# Patient Record
Sex: MALE | Race: WHITE | Employment: OTHER | URBAN - METROPOLITAN AREA
[De-identification: names, ages, dates, MRNs, and addresses within clinical notes are randomized per-mention and may not be internally consistent; named-entity substitution may affect disease eponyms.]

---

## 2017-01-01 ENCOUNTER — TELEPHONE (OUTPATIENT)
Dept: FAMILY MEDICINE CLINIC | Facility: CLINIC | Age: 82
End: 2017-01-01

## 2017-01-01 ENCOUNTER — HOSPITAL ENCOUNTER (OUTPATIENT)
Dept: CV DIAGNOSTICS | Facility: HOSPITAL | Age: 82
Discharge: HOME | End: 2017-01-01
Attending: INTERNAL MEDICINE
Payer: MEDICARE

## 2017-01-01 ENCOUNTER — MED REC SCAN ONLY (OUTPATIENT)
Dept: FAMILY MEDICINE CLINIC | Facility: CLINIC | Age: 82
End: 2017-01-01

## 2017-01-01 DIAGNOSIS — R00.2 AWARENESS OF HEART BEAT: ICD-10-CM

## 2017-01-01 DIAGNOSIS — R00.1: ICD-10-CM

## 2017-01-01 DIAGNOSIS — R53.83 OTHER FATIGUE: Primary | ICD-10-CM

## 2017-01-01 DIAGNOSIS — E55.9 VITAMIN D DEFICIENCY: ICD-10-CM

## 2017-01-01 PROCEDURE — 93225 XTRNL ECG REC<48 HRS REC: CPT | Performed by: INTERNAL MEDICINE

## 2017-01-01 PROCEDURE — 93227 XTRNL ECG REC<48 HR R&I: CPT | Performed by: INTERNAL MEDICINE

## 2017-01-01 PROCEDURE — 93226 XTRNL ECG REC<48 HR SCAN A/R: CPT | Performed by: INTERNAL MEDICINE

## 2017-01-01 RX ORDER — PANTOPRAZOLE SODIUM 40 MG/1
TABLET, DELAYED RELEASE ORAL
Qty: 90 TABLET | Refills: 3 | Status: SHIPPED | OUTPATIENT
Start: 2017-01-01 | End: 2018-01-01

## 2017-01-01 RX ORDER — LANCETS
EACH MISCELLANEOUS
Qty: 100 EACH | Refills: 3 | Status: SHIPPED | OUTPATIENT
Start: 2017-01-01 | End: 2018-01-01

## 2017-01-01 RX ORDER — SIMVASTATIN 20 MG
TABLET ORAL
Qty: 90 TABLET | Refills: 1 | Status: SHIPPED | OUTPATIENT
Start: 2017-01-01 | End: 2018-01-01

## 2017-01-01 RX ORDER — ESCITALOPRAM OXALATE 10 MG/1
TABLET ORAL
Qty: 90 TABLET | Refills: 3 | Status: SHIPPED | OUTPATIENT
Start: 2017-01-01 | End: 2018-01-01

## 2017-01-01 RX ORDER — CARVEDILOL 3.12 MG/1
3.12 TABLET ORAL 2 TIMES DAILY WITH MEALS
Qty: 60 TABLET | Refills: 3 | Status: SHIPPED | OUTPATIENT
Start: 2017-01-01 | End: 2017-01-01

## 2017-01-01 RX ORDER — CARVEDILOL 3.12 MG/1
TABLET ORAL
Qty: 180 TABLET | Refills: 2 | Status: SHIPPED | OUTPATIENT
Start: 2017-01-01 | End: 2018-01-01

## 2017-01-01 RX ORDER — ALBUTEROL SULFATE 90 UG/1
AEROSOL, METERED RESPIRATORY (INHALATION)
Qty: 25.5 G | Refills: 3 | Status: SHIPPED | OUTPATIENT
Start: 2017-01-01

## 2017-01-01 RX ORDER — SIMVASTATIN 20 MG
TABLET ORAL
Qty: 90 TABLET | Refills: 1 | Status: SHIPPED | OUTPATIENT
Start: 2017-01-01 | End: 2017-01-01

## 2017-01-02 ENCOUNTER — TELEPHONE (OUTPATIENT)
Dept: FAMILY MEDICINE CLINIC | Facility: CLINIC | Age: 82
End: 2017-01-02

## 2017-01-02 ENCOUNTER — OFFICE VISIT (OUTPATIENT)
Dept: FAMILY MEDICINE CLINIC | Facility: CLINIC | Age: 82
End: 2017-01-02

## 2017-01-02 VITALS
WEIGHT: 200 LBS | DIASTOLIC BLOOD PRESSURE: 68 MMHG | RESPIRATION RATE: 22 BRPM | TEMPERATURE: 99 F | SYSTOLIC BLOOD PRESSURE: 112 MMHG | HEART RATE: 78 BPM | BODY MASS INDEX: 26 KG/M2

## 2017-01-02 DIAGNOSIS — J18.9 CAP (COMMUNITY ACQUIRED PNEUMONIA): Primary | ICD-10-CM

## 2017-01-02 PROCEDURE — 99214 OFFICE O/P EST MOD 30 MIN: CPT | Performed by: FAMILY MEDICINE

## 2017-01-02 RX ORDER — IPRATROPIUM BROMIDE AND ALBUTEROL SULFATE 2.5; .5 MG/3ML; MG/3ML
3 SOLUTION RESPIRATORY (INHALATION) EVERY 6 HOURS PRN
Qty: 360 ML | Refills: 3 | Status: SHIPPED | OUTPATIENT
Start: 2017-01-02

## 2017-01-02 RX ORDER — IPRATROPIUM BROMIDE AND ALBUTEROL SULFATE 2.5; .5 MG/3ML; MG/3ML
3 SOLUTION RESPIRATORY (INHALATION) EVERY 6 HOURS PRN
COMMUNITY
End: 2017-01-02

## 2017-01-02 NOTE — PATIENT INSTRUCTIONS
Exercising with Chronic Lung Disease: Keep Moving! Think about what you can do to make exercise a way of life. How can you work it into your daily routine? How can you make it more enjoyable? The suggestions below may lead to some ideas of your own. · Exhale as you slowly press the weights upward. Reach as high as you comfortably can. Avoid locking your elbows. · Inhale while you slowly lower the weights to your shoulders. Repeat as directed.   Special instructions: ___________________________________ 1. Relax your neck and shoulder muscles. Inhale slowly through your nose for at least 2 counts. 2. Pucker your lips as if to blow out a candle. Exhale slowly and gently through your pursed lips for at least twice as long as you inhaled.    © 6829-2079 The Dyspnea Scale adapted by permission from [facility] [facility], 2004, Guidelines for Cardiac Rehabilitation and Secondary Prevention Programs, 4th ed.  [facility], [place]: [employer]), 81.        Checking your h

## 2017-01-02 NOTE — PROGRESS NOTES
Patient presents with:  Hospital F/U: [facility] admitted on 12/20/16 UTI and pneumonia      HPI:   Rosie Lundberg is a 80year old male who presents for pneumonia f/u from the hospital.  Cough is improving, now mild,intermittent, pt still on antibioti SOLOSTAR) 300 UNIT/ML Subcutaneous Solution Pen-injector Inject 2 Units into the skin every morning. Disp: 2 pen Rfl: 0   docusate sodium 100 MG Oral Cap Take 100 mg by mouth 2 (two) times daily.  Disp: 60 capsule Rfl: 0   B Complex-C (SUPER B COMPLEX OR) T Comment Procedure: ESOPHAGOGASTRODUODENOSCOPY (EGD);   Surgeon: Je Pena MD;  Location: [facility] ENDOSCOPY    LAPAROSCOPIC CHOLECYSTECTOMY N/A 9/28/2016    Comment Procedure: LAPAROSCOPIC CHOLECYSTECTOMY;  Surgeon: Madison Gonzalez MD;  Location: [facility] ipratropium-albuterol (DUONEB) 0.5-2.5 (3) MG/3ML Inhalation Solution; Take 3 mL by nebulization every 6 (six) hours as needed. Follow up with Dr. Silverio Robert. The patient indicates understanding of these issues and agrees to the plan.   The patient is ask

## 2017-01-04 ENCOUNTER — TELEPHONE (OUTPATIENT)
Dept: FAMILY MEDICINE CLINIC | Facility: CLINIC | Age: 82
End: 2017-01-04

## 2017-02-02 ENCOUNTER — OFFICE VISIT (OUTPATIENT)
Dept: FAMILY MEDICINE CLINIC | Facility: CLINIC | Age: 82
End: 2017-02-02

## 2017-02-02 VITALS
OXYGEN SATURATION: 91 % | WEIGHT: 200 LBS | RESPIRATION RATE: 20 BRPM | SYSTOLIC BLOOD PRESSURE: 118 MMHG | BODY MASS INDEX: 26 KG/M2 | DIASTOLIC BLOOD PRESSURE: 72 MMHG | HEART RATE: 76 BPM

## 2017-02-02 DIAGNOSIS — Z00.00 MEDICARE ANNUAL WELLNESS VISIT, SUBSEQUENT: Primary | ICD-10-CM

## 2017-02-02 DIAGNOSIS — E78.00 PURE HYPERCHOLESTEROLEMIA: ICD-10-CM

## 2017-02-02 DIAGNOSIS — E11.9 TYPE 2 DIABETES MELLITUS WITHOUT COMPLICATION, WITH LONG-TERM CURRENT USE OF INSULIN (HCC): ICD-10-CM

## 2017-02-02 DIAGNOSIS — Z91.81 AT RISK FOR FALLING: ICD-10-CM

## 2017-02-02 DIAGNOSIS — K21.9 GASTROESOPHAGEAL REFLUX DISEASE WITHOUT ESOPHAGITIS: ICD-10-CM

## 2017-02-02 DIAGNOSIS — Z99.81 OXYGEN DEPENDENT: ICD-10-CM

## 2017-02-02 DIAGNOSIS — I10 BENIGN HYPERTENSION: ICD-10-CM

## 2017-02-02 DIAGNOSIS — J41.1 MUCOPURULENT CHRONIC BRONCHITIS (HCC): ICD-10-CM

## 2017-02-02 DIAGNOSIS — Z79.4 TYPE 2 DIABETES MELLITUS WITHOUT COMPLICATION, WITH LONG-TERM CURRENT USE OF INSULIN (HCC): ICD-10-CM

## 2017-02-02 DIAGNOSIS — Z23 NEED FOR PNEUMOCOCCAL VACCINATION: ICD-10-CM

## 2017-02-02 PROCEDURE — 90732 PPSV23 VACC 2 YRS+ SUBQ/IM: CPT | Performed by: FAMILY MEDICINE

## 2017-02-02 PROCEDURE — G0009 ADMIN PNEUMOCOCCAL VACCINE: HCPCS | Performed by: FAMILY MEDICINE

## 2017-02-02 PROCEDURE — G0439 PPPS, SUBSEQ VISIT: HCPCS | Performed by: FAMILY MEDICINE

## 2017-02-02 RX ORDER — SIMVASTATIN 20 MG
20 TABLET ORAL NIGHTLY
Qty: 30 TABLET | Refills: 6 | Status: SHIPPED | OUTPATIENT
Start: 2017-02-02

## 2017-02-02 NOTE — PROGRESS NOTES
Ranjit Sanchez is a 80year old male who presents for a Medicare wellness complete physical exam.     HPI:         Needs refills of medications for Need for pneumococcal vaccination  (primary encounter diagnosis)    Wt Readings from Last 3 Encounters:  02/0 Rfl: 3   ipratropium-albuterol (DUONEB) 0.5-2.5 (3) MG/3ML Inhalation Solution Take 3 mL by nebulization every 6 (six) hours as needed. Disp: 360 mL Rfl: 3   furosemide (LASIX) 20 MG Oral Tab 1/2 to 1 tablet daily prn.  Disp: 90 tablet Rfl: 3   PROAIR HFA 1 each Rfl: 0      HISTORICAL INFORMATION   Past Medical History   Diagnosis Date   • Type II or unspecified type diabetes mellitus without mention of complication, not stated as uncontrolled    • Unspecified essential hypertension    • Other and unspecified : Y. Children: Y.    Exercise: minimal.  Diet: watches sugar closely and watches calories closely     REVIEW OF SYSTEMS:   GENERAL: feels well otherwise  SKIN: denies any unusual skin lesions  EYES: denies blurred vision or double vision  HEENT: am[age] year old male who presents for a Medicare wellness complete physical exam.   Pt' s weight is Body mass index is 25.67 kg/(m^2). , patient given information on exercise and healthy diet.       The patient indicates understanding of these issues and agrees to Vaccine, No Preserv, 3YR +                          10/12/2015      Pneumococcal (Prevnar 13)                          10/12/2013      Pneumovax 23 (Lot Mgr)                          02/02/2017

## 2017-02-02 NOTE — PATIENT INSTRUCTIONS
Exercising with Chronic Lung Disease: Increasing Strength  Building muscle strength will allow you to do certain activities with less effort. Some of these exercises involve using hand weights.  If you don’t have weights at home, you can use water bottles 4. Inhale while you lower your leg. Repeat as directed. Then switch legs. Leg raise   Note: If you’re not comfortable lying on the floor, this exercise can also be done sitting in a chair.   Special instructions: ________________________________________

## 2017-02-10 ENCOUNTER — MED REC SCAN ONLY (OUTPATIENT)
Dept: FAMILY MEDICINE CLINIC | Facility: CLINIC | Age: 82
End: 2017-02-10

## 2017-02-27 ENCOUNTER — TELEPHONE (OUTPATIENT)
Dept: FAMILY MEDICINE CLINIC | Facility: CLINIC | Age: 82
End: 2017-02-27

## 2017-02-27 NOTE — TELEPHONE ENCOUNTER
Fax received from [facility] regarding DM supplies. Medicare form needs to be signed and faxed back. Form provided to Dr. Aditi Kasper to complete and fax.

## 2017-03-01 ENCOUNTER — TELEPHONE (OUTPATIENT)
Dept: FAMILY MEDICINE CLINIC | Facility: CLINIC | Age: 82
End: 2017-03-01

## 2017-03-01 NOTE — TELEPHONE ENCOUNTER
DM supply form completed again and faxed back along with last DM f/up on 10/12/16, med list, and last A1c.

## 2017-03-20 RX ORDER — CARVEDILOL 3.12 MG/1
3.12 TABLET ORAL 2 TIMES DAILY WITH MEALS
Qty: 60 TABLET | Refills: 3 | Status: SHIPPED | OUTPATIENT
Start: 2017-03-20 | End: 2017-01-01

## 2017-03-21 ENCOUNTER — MED REC SCAN ONLY (OUTPATIENT)
Dept: FAMILY MEDICINE CLINIC | Facility: CLINIC | Age: 82
End: 2017-03-21

## 2017-03-28 ENCOUNTER — APPOINTMENT (OUTPATIENT)
Dept: GENERAL RADIOLOGY | Facility: HOSPITAL | Age: 82
DRG: 177 | End: 2017-03-28
Attending: EMERGENCY MEDICINE
Payer: MEDICARE

## 2017-03-28 ENCOUNTER — APPOINTMENT (OUTPATIENT)
Dept: CT IMAGING | Facility: HOSPITAL | Age: 82
DRG: 177 | End: 2017-03-28
Attending: STUDENT IN AN ORGANIZED HEALTH CARE EDUCATION/TRAINING PROGRAM
Payer: MEDICARE

## 2017-03-28 ENCOUNTER — HOSPITAL ENCOUNTER (INPATIENT)
Facility: HOSPITAL | Age: 82
LOS: 6 days | Discharge: HOME HEALTH | DRG: 177 | End: 2017-04-03
Attending: EMERGENCY MEDICINE | Admitting: STUDENT IN AN ORGANIZED HEALTH CARE EDUCATION/TRAINING PROGRAM
Payer: MEDICARE

## 2017-03-28 DIAGNOSIS — I10 ESSENTIAL HYPERTENSION: ICD-10-CM

## 2017-03-28 DIAGNOSIS — E11.9 TYPE 2 DIABETES MELLITUS WITHOUT COMPLICATION, WITHOUT LONG-TERM CURRENT USE OF INSULIN (HCC): ICD-10-CM

## 2017-03-28 DIAGNOSIS — J84.10 PULMONARY FIBROSIS (HCC): ICD-10-CM

## 2017-03-28 DIAGNOSIS — J40 BRONCHITIS: ICD-10-CM

## 2017-03-28 DIAGNOSIS — R06.00 DYSPNEA, UNSPECIFIED TYPE: Primary | ICD-10-CM

## 2017-03-28 PROCEDURE — 71250 CT THORAX DX C-: CPT

## 2017-03-28 PROCEDURE — 99222 1ST HOSP IP/OBS MODERATE 55: CPT | Performed by: STUDENT IN AN ORGANIZED HEALTH CARE EDUCATION/TRAINING PROGRAM

## 2017-03-28 PROCEDURE — 71010 XR CHEST AP PORTABLE  (CPT=71010): CPT

## 2017-03-28 RX ORDER — HEPARIN SODIUM 5000 [USP'U]/ML
5000 INJECTION, SOLUTION INTRAVENOUS; SUBCUTANEOUS EVERY 8 HOURS
Status: DISCONTINUED | OUTPATIENT
Start: 2017-03-28 | End: 2017-04-03

## 2017-03-28 RX ORDER — IPRATROPIUM BROMIDE AND ALBUTEROL SULFATE 2.5; .5 MG/3ML; MG/3ML
3 SOLUTION RESPIRATORY (INHALATION)
Status: DISCONTINUED | OUTPATIENT
Start: 2017-03-28 | End: 2017-04-03

## 2017-03-28 RX ORDER — METHYLPREDNISOLONE SODIUM SUCCINATE 125 MG/2ML
60 INJECTION, POWDER, LYOPHILIZED, FOR SOLUTION INTRAMUSCULAR; INTRAVENOUS EVERY 8 HOURS
Status: DISCONTINUED | OUTPATIENT
Start: 2017-03-28 | End: 2017-03-28

## 2017-03-28 RX ORDER — ACETAMINOPHEN 325 MG/1
650 TABLET ORAL EVERY 6 HOURS PRN
Status: DISCONTINUED | OUTPATIENT
Start: 2017-03-28 | End: 2017-04-03

## 2017-03-28 RX ORDER — PREDNISONE 20 MG/1
40 TABLET ORAL
Status: DISCONTINUED | OUTPATIENT
Start: 2017-03-29 | End: 2017-03-31

## 2017-03-28 RX ORDER — METHYLPREDNISOLONE SODIUM SUCCINATE 125 MG/2ML
80 INJECTION, POWDER, LYOPHILIZED, FOR SOLUTION INTRAMUSCULAR; INTRAVENOUS EVERY 6 HOURS
Status: DISCONTINUED | OUTPATIENT
Start: 2017-03-29 | End: 2017-03-28

## 2017-03-28 RX ORDER — CLINDAMYCIN PHOSPHATE 600 MG/50ML
600 INJECTION INTRAVENOUS EVERY 8 HOURS
Status: DISCONTINUED | OUTPATIENT
Start: 2017-03-28 | End: 2017-03-31

## 2017-03-28 RX ORDER — ATORVASTATIN CALCIUM 10 MG/1
10 TABLET, FILM COATED ORAL NIGHTLY
Status: DISCONTINUED | OUTPATIENT
Start: 2017-03-28 | End: 2017-04-03

## 2017-03-28 RX ORDER — ONDANSETRON 2 MG/ML
4 INJECTION INTRAMUSCULAR; INTRAVENOUS EVERY 4 HOURS PRN
Status: DISCONTINUED | OUTPATIENT
Start: 2017-03-28 | End: 2017-03-28

## 2017-03-28 RX ORDER — ESCITALOPRAM OXALATE 10 MG/1
10 TABLET ORAL DAILY
Status: DISCONTINUED | OUTPATIENT
Start: 2017-03-28 | End: 2017-04-03

## 2017-03-28 RX ORDER — ONDANSETRON 2 MG/ML
4 INJECTION INTRAMUSCULAR; INTRAVENOUS EVERY 6 HOURS PRN
Status: DISCONTINUED | OUTPATIENT
Start: 2017-03-28 | End: 2017-04-03

## 2017-03-28 RX ORDER — METHYLPREDNISOLONE SODIUM SUCCINATE 125 MG/2ML
125 INJECTION, POWDER, LYOPHILIZED, FOR SOLUTION INTRAMUSCULAR; INTRAVENOUS ONCE
Status: COMPLETED | OUTPATIENT
Start: 2017-03-28 | End: 2017-03-28

## 2017-03-28 RX ORDER — ALBUTEROL SULFATE 2.5 MG/3ML
2.5 SOLUTION RESPIRATORY (INHALATION) EVERY 2 HOUR PRN
Status: DISCONTINUED | OUTPATIENT
Start: 2017-03-28 | End: 2017-04-03

## 2017-03-28 RX ORDER — PREDNISONE 20 MG/1
20 TABLET ORAL ONCE
Status: COMPLETED | OUTPATIENT
Start: 2017-03-28 | End: 2017-03-28

## 2017-03-28 RX ORDER — IPRATROPIUM BROMIDE AND ALBUTEROL SULFATE 2.5; .5 MG/3ML; MG/3ML
3 SOLUTION RESPIRATORY (INHALATION) ONCE
Status: COMPLETED | OUTPATIENT
Start: 2017-03-28 | End: 2017-03-28

## 2017-03-28 RX ORDER — CARVEDILOL 3.12 MG/1
3.12 TABLET ORAL 2 TIMES DAILY WITH MEALS
Status: DISCONTINUED | OUTPATIENT
Start: 2017-03-28 | End: 2017-04-03

## 2017-03-28 RX ORDER — DEXTROSE MONOHYDRATE 25 G/50ML
50 INJECTION, SOLUTION INTRAVENOUS
Status: DISCONTINUED | OUTPATIENT
Start: 2017-03-28 | End: 2017-04-03

## 2017-03-28 RX ORDER — PANTOPRAZOLE SODIUM 40 MG/1
40 TABLET, DELAYED RELEASE ORAL DAILY
Status: DISCONTINUED | OUTPATIENT
Start: 2017-03-28 | End: 2017-04-03

## 2017-03-28 RX ORDER — SODIUM CHLORIDE 9 MG/ML
INJECTION, SOLUTION INTRAVENOUS CONTINUOUS
Status: DISCONTINUED | OUTPATIENT
Start: 2017-03-28 | End: 2017-03-28

## 2017-03-28 RX ORDER — ASPIRIN 81 MG/1
81 TABLET, CHEWABLE ORAL DAILY
Status: DISCONTINUED | OUTPATIENT
Start: 2017-03-28 | End: 2017-04-03

## 2017-03-28 RX ORDER — CEFDINIR 300 MG/1
300 CAPSULE ORAL 2 TIMES DAILY
Status: ON HOLD | COMMUNITY
End: 2017-04-03

## 2017-03-28 NOTE — PLAN OF CARE
RESPIRATORY - ADULT    • Achieves optimal ventilation and oxygenation Progressing          SAFETY ADULT - FALL    • Free from fall injury Progressing          PROBLEM: SOB, + metapneumovirus, possible PNA    ASSESSMENT: Pt is A&O x4, hard of hearing.  VSS,

## 2017-03-28 NOTE — H&P
[facility]ITAL[facility]  History and Physical     Hector [place] Patient Status:  Emergency    10/6/1925 MRN [id number]   Location [address] [facility] Attending Andre Martell MD   Hosp Day # 0 PCP Jennifer Garcia MD     Chief Complaint: Cough ENDOSCOPIC ULTRASOUND EXAM  9/16    Comment pancreas cysts; cystic duct stones    EGD N/A 9/20/2016    Comment Procedure: ESOPHAGOGASTRODUODENOSCOPY (EGD);   Surgeon: Sandy Min MD;  Location: [address] [place] [address]    LAPAROSCOPIC CHOLECYSTECTOMY N/A 9/28/2 not apply Misc Test blood glucose 4 times daily Disp: 400 each Rfl: 4   Insulin Pen Needle (BD PEN NEEDLE MINI U/F) 31G X 5 MM Does not apply Misc USE 4 TIMES DAILY Disp: 150 each Rfl: 4   furosemide (LASIX) 20 MG Oral Tab 1/2 to 1 tablet daily prn.  Disp: tenderness or deformity. Abdomen: Soft, nontender, nondistended. Positive bowel sounds. No rebound, guarding or organomegaly. Neurologic: No focal neurological deficits. CNII-XII grossly intact. Musculoskeletal: Moves all extremities.   Extremities: No

## 2017-03-28 NOTE — CM/SW NOTE
03/28/17 1500   CM/SW Referral Data   Referral Source Physician;Social Work (self-referral)   Reason for Referral Discharge planning   Informant Patient; Children   Pertinent Medical Hx   Primary Care Physician Name Arianna Osborne 103   Patient Info   Patient's M

## 2017-03-28 NOTE — ED PROVIDER NOTES
Patient Seen in: [facility] Emergency Department    History   Patient presents with:  Fever Sepsis (infectious)    Stated Complaint: fever cough     HPI    69-year-old male with history of type 2 diabetes, hypertension, hyperlipidemia, stroke, anxie Comment cardiac    BACK SURGERY      HIP REPLACEMENT SURGERY      Comment lt hip    HERNIA SURGERY      PROSTATE LASER ENUCLEATION      OTHER SURGICAL HISTORY  04/25/2016    Comment cystoscopy- dr. Pennington   9/16    Comment panc Tab,  Take 1 tablet (10 mg total) by mouth daily. Pantoprazole Sodium 40 MG Oral Tab EC,  Take 1 tablet (40 mg total) by mouth daily.    Insulin Glargine (TOUJEO SOLOSTAR) 300 UNIT/ML Subcutaneous Solution Pen-injector,  Inject 2 Units into the skin every is no lymphadenopathy or carotid bruit. Cardiovascular exam: Regular rate and rhythm. There are no murmurs, rubs, gallops. Lungs: Coarse breath sounds bilaterally. There is no audible wheezes, Rales, rhonchi. Abdomen: Soft, nontender, nondistended.   Pily Nadir coarse breath sounds on examination. He will be admitted for observation continued nebulizer treatments per  Chest x-ray shows increase in interstitial markings which appear to be consistent with pulmonary fibrosis.   Patient empirically treated with Kari Montano

## 2017-03-28 NOTE — ED INITIAL ASSESSMENT (HPI)
Pt with c/o fever, cough and weakness for past few days. Seen yesterday at another hosp. Negative Chest xray.

## 2017-03-28 NOTE — CONSULTS
[facility] Pulmonary, Critical Care and Sleep    Gracia Cordero Patient Status:  Inpatient    10/6/1925 MRN [id number]   Location 504/504-A PCP Essence Rasheed MD     Date of Admission: 3/28/2017  History of Present Illness: Pt is a 80year old male consulted f Comment pancreas cysts; cystic duct stones    EGD N/A 9/20/2016    Comment Procedure: ESOPHAGOGASTRODUODENOSCOPY (EGD);   Surgeon: Jamal Hooker MD;  Location: [address] [place] [address] ENDOSCOPY    LAPAROSCOPIC CHOLECYSTECTOMY N/A 9/28/2016    Comment Procedure: John Godoy Pantoprazole Sodium 40 MG Oral Tab EC Take 1 tablet (40 mg total) by mouth daily. Disp: 90 tablet Rfl: 3   Insulin Glargine (TOUJEO SOLOSTAR) 300 UNIT/ML Subcutaneous Solution Pen-injector Inject 2 Units into the skin every morning.  Disp: 2 pen Rfl: 0   B Work:Retired  for [employer]. Former  [employer], [address], [employer]. Was on front lines and in the field. TB: no known   Asbestos: no known.      Family History   Problem Relation Age of Onset   • Cancer Father MCHC 32.7 31.0-37.0 g/dL   RDW 13.2 11.5-16.0 %   RDW-SD 45.5 35.1-46.3 fL   Neutrophil Absolute Prelim 5.04 1.30-6.70 x10 (3) uL   Neutrophil Absolute 5.04 1.30-6.70 x10(3) uL   Lymphocyte Absolute 1.46 0.90-4.00 x10(3) uL   Monocyte Absolute 0.60 0.10-0. Total Protein 7.5 6.1-8.3 g/dL   Albumin 3.0 (L) 3.5-4.8 g/dL   Sodium 140 136-144 mmol/L   Potassium 4.2 3.6-5.1 mmol/L   Chloride 106 101-111 mmol/L   CO2 27.0 22.0-32.0 mmol/L   -HEMOGLOBIN A1C   Collection Time: 03/28/17  5:27 AM   Result Value Ref Ran 1. Dyspnea and hypoxia secondary to likely viral pneumonitis with underlying pulmonary fibrosis. ? also aspiration. Could all be explained with metapneumovirus infection. O2 protocol. 2. ID: Pneumonitis. Likely viral.   PCT was negative.    Ok to h

## 2017-03-28 NOTE — PHYSICAL THERAPY NOTE
PHYSICAL THERAPY EVALUATION - INPATIENT     Room Number: 143/133-H  Evaluation Date: 3/28/2017  Type of Evaluation: Initial  Physician Order: PT Eval and Treat    Presenting Problem:  (Dyspnea, cough,fever, Pulmonary fibrosis)  Reason for Therapy: Franklyn flight   • Esophageal reflux    • Anesthesia complication      slow to awaken   • Arthritis        Past Surgical History      Past Surgical History    APPENDECTOMY      SURGICAL STENT      Comment cardiac    BACK SURGERY      HIP REPLACEMENT SURGERY      C within functional limits     Lower extremity strength is within functional limits     BALANCE  Static Sitting: Good  Dynamic Sitting: Good  Static Standing: Fair -  Dynamic Standing: Poor +    ADDITIONAL TESTS  Additional Tests: Elderly Mobility Scale home. Pt prefers higher surfaces for sit<>stand. Trained pt for transfers with RW/CGAx1. Pt needed lots of VC for proper hand placement, safety and to avoid premature sitting. Pt with decrease safety awareness.  Trained pt for amb to 60ft with RW/Min to CG mobility and needs assist for mobility and amb. These impairments manifest themselves as functional limitations in mobility, transfers, gait endurance and distance. Pt has chair lift at home so don't need to negotiate stairs.   The patient is below his

## 2017-03-28 NOTE — SLP NOTE
ADULT SWALLOWING EVALUATION    ASSESSMENT & PLAN   ASSESSMENT  Pt seen at bedside this AM for bedside swallow evaluation. Speech consulted due to pt presenting with aspiration and MD concerned for aspiration given pt's dysphagia history.  Pt cooperative, pl History   Diagnosis Date   • Type II or unspecified type diabetes mellitus without mention of complication, not stated as uncontrolled    • Unspecified essential hypertension    • Other and unspecified hyperlipidemia    • Stroke [facility])    • Kidney stone recommended.     Coronary artery calcification.        CXR 3/28/17:   Impression      CONCLUSION:  Extensive interstitial opacities may relate to chronic interstitial lung disease with superimposed infectious process not excluded.  Small right pleural effus

## 2017-03-28 NOTE — OCCUPATIONAL THERAPY NOTE
OCCUPATIONAL THERAPY EVALUATION - INPATIENT     Room Number: 062/367-U  Evaluation Date: 3/28/2017  Type of Evaluation: Initial  Presenting Problem: fever, cough, pulmonary fibrosis, dyspnea and chills     Physician Order: IP Consult to Occupational Thersideangelo Metz cardiac    BACK SURGERY      HIP REPLACEMENT SURGERY      Comment lt hip    HERNIA SURGERY      PROSTATE LASER ENUCLEATION      OTHER SURGICAL HISTORY  04/25/2016    Comment cystoscopy- dr. Gill Alfaro  9/16    Comment pancreas cysts; COORDINATION  Gross Motor    WFL    Fine Motor    WFL      ADDITIONAL TESTS                       Other Test(s): [place] IADL scale: 2/8              IADL  Pt scored a 2/8 on the [place] Instrumental Activities of Daily Living Scale indicating a signific standing>sit in his chair with min assist and min cues for RW use and hand placement. Pt was educated on safety precautions. Pt was left in his chair with questions answered, needs met and call light within reach.  Pt's RN/PCT was made aware of pt's present transfer to toilet:  with modified independent    UE Exercise Program Goal  Patient will be independent with bilateral AROM HEP (home exercise program). Additional Goals:  Pt will demo good safety awareness with ADL and functional mobility.

## 2017-03-28 NOTE — PLAN OF CARE
METABOLIC/FLUID AND ELECTROLYTES - ADULT    • Glucose maintained within prescribed range Progressing    • Electrolytes maintained within normal limits Progressing        Patient/Family Goals    • Patient/Family Long Term Goal Progressing    • Patient/Famil

## 2017-03-28 NOTE — PROGRESS NOTES
Pt feels ok though still w/ dyspnea. He endorses a history of ? Aspiration in the past.  RVP +ve for metapneumovirus. Cont. Supportive treatment. Start ABX for presumptive PNA on top of viral bronchitis.   Not completely clear given his NL PCT though fev

## 2017-03-29 ENCOUNTER — APPOINTMENT (OUTPATIENT)
Dept: GENERAL RADIOLOGY | Facility: HOSPITAL | Age: 82
DRG: 177 | End: 2017-03-29
Attending: HOSPITALIST
Payer: MEDICARE

## 2017-03-29 ENCOUNTER — APPOINTMENT (OUTPATIENT)
Dept: GENERAL RADIOLOGY | Facility: HOSPITAL | Age: 82
DRG: 177 | End: 2017-03-29
Attending: INTERNAL MEDICINE
Payer: MEDICARE

## 2017-03-29 PROBLEM — J20.9 BRONCHOSPASM WITH BRONCHITIS, ACUTE: Status: ACTIVE | Noted: 2017-03-29

## 2017-03-29 PROBLEM — J12.3 HUMAN METAPNEUMOVIRUS (HMPV) PNEUMONIA: Status: ACTIVE | Noted: 2017-03-29

## 2017-03-29 PROCEDURE — 71035 XR CHEST DECUBITUS (CPT=71035): CPT

## 2017-03-29 PROCEDURE — 74230 X-RAY XM SWLNG FUNCJ C+: CPT

## 2017-03-29 PROCEDURE — 99233 SBSQ HOSP IP/OBS HIGH 50: CPT | Performed by: INTERNAL MEDICINE

## 2017-03-29 PROCEDURE — 71020 XR CHEST PA + LAT CHEST (CPT=71020): CPT

## 2017-03-29 RX ORDER — HYDRALAZINE HYDROCHLORIDE 20 MG/ML
5 INJECTION INTRAMUSCULAR; INTRAVENOUS EVERY 6 HOURS PRN
Status: DISCONTINUED | OUTPATIENT
Start: 2017-03-29 | End: 2017-04-03

## 2017-03-29 NOTE — PROGRESS NOTES
[facility]  Progress Note    Malin Bank Patient Status:  Inpatient    10/6/1925 MRN [id number]   [facility] 5NW-A Attending Jose A Yoon MD   Hosp Day # 1 PCP Jill Dickson MD     CC: Cough, fever, shortness of breath    SUBJEC lateral chest radiographs were obtained.      PATIENT STATED HISTORY: (As transcribed by Technologist)  Patient adds no additional history.             FINDINGS:     LUNGS:  Stable. Interstitial opacities diffusely throughout both lungs.  The opacity at the 2-10 Units Subcutaneous TID CC and HS   ipratropium-albuterol (DUONEB) nebulizer solution 3 mL 3 mL Nebulization 4 times per day   albuterol sulfate (VENTOLIN) (2.5 MG/3ML) 0.083% nebulizer solution 2.5 mg 2.5 mg Nebulization Q2H PRN   CefTRIAXone Sodium ( were discussed with patient       Alejandrina Orozco MD  3/29/2017  4:09 PM

## 2017-03-29 NOTE — PROGRESS NOTES
SPO2% ON ROOM AIR AT REST: N/A ( did not test pt is on home o2)     SPO2% ON O2 AT REST: 93% ON 3 LITERS PER MINUTE     SPO2% AMBULATION ON ROOM AIR: N/A ( did not test pt is on home o2)    SPO2% AMBULATION ON O2: 90% ON: 3  LITERS PER MINUTE

## 2017-03-29 NOTE — PROGRESS NOTES
Multidisciplinary Discharge Rounds held 3/29/2017. Treatment team members present today include , , Charge Nurse,  Nurse, RT, PT and Pharmacy caring for [place].      Other care providers present:    Patient Active Problem

## 2017-03-29 NOTE — PROGRESS NOTES
pulm / cc    Pt was off floor when attempted to see, will see tomorrow; of note decub film with only small L effusion, would continue to hold on thoracentesis.

## 2017-03-29 NOTE — VIDEO SWALLOW STUDY NOTE
ADULT VIDEOFLUOROSCOPIC SWALLOWING STUDY    Admission Date: 3/28/2017  Evaluation Date: 03/29/2017  Radiologist: Evita Fulton    Dear Dr. Chon East,  This letter is to inform you of [facility] Videofluoroscopic Swallowing Study results and/or plan of treatme Current Diet Consistency: NPO  Prior Level of Function: Assistance/Support for ADL's  Prior Living Situation: Home with support  History of Recent: Pneumonia; Difficulty breathing  Precautions: None  Imaging results: CXR 3/29/17:      Impression      rate;Small bites and sips;Effortful swallow  Effectiveness: Yes         HARD SOLID  Triggered at: Valleculae  Premature Spillage to: Valleculae  Cleared/Reduced with: Secondary swallow  Strategy(ies) Implemented (Hard Solid):  Slow rate;Small bites and sips elevation by completing Mendelsohn exercise 10x with max verbal and visual cues to increase laryngeal movement and opening of CP to assure safe/efficiency swallow.     Goal #6 Pt will increase opening of upper esophgeal sphincter to improve anterior larynge

## 2017-03-30 PROCEDURE — 99232 SBSQ HOSP IP/OBS MODERATE 35: CPT | Performed by: INTERNAL MEDICINE

## 2017-03-30 RX ORDER — DOCUSATE SODIUM 100 MG/1
100 CAPSULE, LIQUID FILLED ORAL 2 TIMES DAILY
Status: DISCONTINUED | OUTPATIENT
Start: 2017-03-30 | End: 2017-04-03

## 2017-03-30 RX ORDER — POLYETHYLENE GLYCOL 3350 17 G/17G
17 POWDER, FOR SOLUTION ORAL DAILY
Status: DISCONTINUED | OUTPATIENT
Start: 2017-03-30 | End: 2017-04-03

## 2017-03-30 RX ORDER — BISACODYL 10 MG
10 SUPPOSITORY, RECTAL RECTAL
Status: DISCONTINUED | OUTPATIENT
Start: 2017-03-30 | End: 2017-04-03

## 2017-03-30 NOTE — PROGRESS NOTES
[facility]  Progress Note    Ritu Guzman Patient Status:  Inpatient    10/6/1925 MRN [id number]   [facility] 5NW-A Attending Saige Otero MD   [address] Day # 2 PCP Clementine Monterroso MD     CC: Cough, fever, shortness of breath    SUBJEC 3/28/2017, 0:49.  EDJOSTIN , CT CHEST (CGS=35649), 3/28/2017, 3:04.      TECHNIQUE:  PA and lateral chest radiographs were obtained.      PATIENT STATED HISTORY: (As transcribed by Technologist)  Patient adds no additional history.             FINDINGS:    dextrose injection 50 mL 50 mL Intravenous Q15 Min PRN   Or      glucose (DEX4) oral liquid 30 g 30 g Oral Q15 Min PRN   Or      Glucose-Vitamin C (DEX-4) 4-0.006 g chewable tab 8 tablet 8 tablet Oral Q15 Min PRN   insulin aspart (NOVOLOG) 100 UNIT/ML fl clinda.   8. Constipation- colace, miralax; dulcolaax suppository prn; improved          Quality:  · DVT Prophylaxis: SCDs, subcutaneous heparin  · CODE status: Full  · Dennis: None    Estimated date of discharge: saturday  Discharge is dependent on: [facility]

## 2017-03-30 NOTE — SLP NOTE
SPEECH DAILY NOTE - INPATIENT    Evaluation Date: 03/30/2017    ASSESSMENT & PLAN   ASSESSMENT  Pt seen at bedside this PM for speech therapy session. Pt cooperative, pleasant, and alert. Pt's son present at bedside.  Pt self reporting tolerance of nectar t 1-2 session(s). IN PROGRESS   Goal #2  The patient/family/caregiver will demonstrate understanding and implementation of aspiration precautions and swallow strategies independently over 1-2 session(s). IN PROGRESS   Goal #3  Pt will increase posterior tong

## 2017-03-30 NOTE — PLAN OF CARE
Impaired Activities of Daily Living    • Achieve highest/safest level of independence in self care Progressing        Impaired Swallowing    • Minimize aspiration risk Progressing        Patient/Family Goals    • Patient/Family Short Term Goal Progressing date: TBD    Current discharge plan: Home with family and [facility]

## 2017-03-30 NOTE — PROGRESS NOTES
[facility] Pulmonary, Critical Care and Sleep    Melissapoppy Calhounmaida Patient Status:  Inpatient    10/6/1925 MRN [id number]   [facility] 5NW-A Attending Rayna Mariee MD   [facility] Day # 2 PCP Ruperto Sethi MD       Date of Admission: 3/28/2017 12:06 AM General: NAD. Neuro: Alert, no focal deficits. HEENT: PERRL  Neck : No LAD  CV: RRR, nl S1, S2, no S4, S3 or murmur. Lungs: Coarse at bases. Abd: Nontender, non distended. Ext: No edema. Skin: No rashes.         Recent Results (from the past 24

## 2017-03-31 PROCEDURE — 99232 SBSQ HOSP IP/OBS MODERATE 35: CPT | Performed by: INTERNAL MEDICINE

## 2017-03-31 RX ORDER — PREDNISONE 20 MG/1
20 TABLET ORAL
Status: COMPLETED | OUTPATIENT
Start: 2017-04-01 | End: 2017-04-03

## 2017-03-31 NOTE — OCCUPATIONAL THERAPY NOTE
OCCUPATIONAL THERAPY TREATMENT NOTE - INPATIENT     Room Number: 983/005-K  Session: 1   Number of Visits to Meet Established Goals: 5    Presenting Problem: fever, cough, pulmonary fibrosis, dyspnea and chills     History related to current admission: Pt HIP REPLACEMENT SURGERY      Comment lt hip    HERNIA SURGERY      PROSTATE LASER ENUCLEATION      OTHER SURGICAL HISTORY  04/25/2016    Comment cystoscopy- dr. Kyree Salas  9/16    Comment pancreas cysts; cystic duct stones    EGD stand>sit. Pt was educated on and performed AROM exercises x 5 reps each for B UE's in all major planes with min cues for proper technique and form. Written HEP issued. Pt was left with call light, telephone and personal items within reach.  All questions transfer from sit to stand:  with modified independent PROGRESSING  Patient will transfer to toilet:  with modified independent PROGRESSING    UE Exercise Program Goal  Patient will be independent with bilateral AROM HEP (home exercise program).  ISSUED 3/3

## 2017-03-31 NOTE — CONSULTS
INFECTIOUS DISEASE CONSULT NOTE    Rosie Lundberg Patient Status:  Inpatient    10/6/1925 MRN [id number]   [facility] 5NW-A Attending Haydee Colin MD   [facility] Day # 3 PCP Lucero Soni • Anesthesia complication      slow to awaken   • Arthritis          Past Surgical History    APPENDECTOMY      SURGICAL STENT      Comment cardiac    BACK SURGERY      HIP REPLACEMENT SURGERY      Comment lt hip    HERNIA SURGERY      PROSTATE LASER ENUCL •  Umeclidinium Bromide (INCRUSE ELLIPTA) 62.5 MCG/INH inhaler 1 puff, 1 puff, Inhalation, Daily  •  aspirin chewable tab 81 mg, 81 mg, Oral, Daily  •  carvedilol (COREG) tab 3.125 mg, 3.125 mg, Oral, BID with meals  •  escitalopram (LEXAPRO) tablet 10 mg, Cardiovascular: distant. Seems to have regular rate and rhythm. Abdomen: Soft, nontender, nondistended. Positive bowel sounds. Musculoskeletal: Full range of motion of all extremities. No swelling noted.   Integument: No rash    Laboratory Data:    Rece   Specimen Information: Blood from Blood,peripheral       Blood Culture Result No Growth 3 Days      Respiratory Panel FLU expanded Once [[id number]] (Abnormal) Collected: 03/28/17 0042     Order Status: Completed Lab Status: Final result Updated: 03/28/17 3/29/2017  CONCLUSION:  1. Left effusion is small, no evidence of loculation.     Dictated by: Jos Light MD on 3/29/2017 at 10:05     Approved by: Jos Light MD            Xr Chest Pa + Lat Chest (jpr=[id number])    3/29/2017  PROCEDURE:  XR CHEST P 3/28/2017  PROCEDURE:  CT OF THE CHEST WITHOUT CONTRAST  COMPARISON:  [facility] , XR CHEST AP PORTABLE  (CPT=71010), 3/28/2017, 0:49.   INDICATIONS:  Fever, cough  TECHNIQUE:  Unenhanced multislice CT scanning is performed through the chest.  Dose reduction te 3/29/2017  PROCEDURE:  SWALLOWING STUDY  TECHNIQUE:  Video fluoroscopic swallowing study was performed in cooperation with the speech pathologist.  Barium of varying consistencies was administered orally with patient in lateral projection.   COMPARISON:  ED 3/28/2017  CONCLUSION:  Extensive interstitial opacities may relate to chronic interstitial lung disease with superimposed infectious process not excluded. Small right pleural effusion.   A preliminary report was provided by the [facility] teleradiology service

## 2017-03-31 NOTE — PROGRESS NOTES
Pt is a 81 y/o male admitted with acute bronchitis with bronchospasm and viral pneumonitis. alert oriented. denies SOB,fever,or N/V.pt is on iv clindamycin and ceftriaxone. on contact and droplet for Gilman pneumo viral infection. up in the chair. pt is on 3 L 02

## 2017-03-31 NOTE — PLAN OF CARE
Problem: Patient/Family Goals  Goal: Patient/Family Short Term Goal  Patient’s Short Term Goal:   3/28PM: to get some sleep  3/29 AM- walk in hallway   3/29PM: have a BM  3/30 AM - \"to stay awake\"  3/30 noc: to keep getting better   3/31-reslove pna    I

## 2017-03-31 NOTE — PROGRESS NOTES
[address] Patient Status:  Inpatient    10/6/1925 MRN [id number]   [facility] 5NW-A Attending Kirsten Reaves MD   Hosp Day # 3 PCP Bradley Sheets MD     Pulm / Critical Care Progress Note     S: Feels better      Sc murmur. Abdomen: soft, non-tender, non-distended, positive BS. Extremity: no edema         Recent Labs   Lab  03/31/17   0520   PLT  160.0     No results for input(s): INR in the last 72 hours.       No results for input(s): NA, K, CL, CO2, BUN, GLUCOSE

## 2017-03-31 NOTE — PHYSICAL THERAPY NOTE
PHYSICAL THERAPY TREATMENT NOTE - INPATIENT    Room Number: 290/605-M     Session: 1   Number of Visits to Meet Established Goals: 5    Presenting Problem:  (Dyspnea, cough,fever, Pulmonary fibrosis)    Problem List  Principal Problem:    Human metapneumo Procedure: ESOPHAGOGASTRODUODENOSCOPY (EGD);   Surgeon: Gretchen Sherman MD;  Location: [facility] ENDOSCOPY    LAPAROSCOPIC CHOLECYSTECTOMY N/A 9/28/2016    Comment Procedure: LAPAROSCOPIC CHOLECYSTECTOMY;  Surgeon: Kacy Gallo MD;  Location: [facility] MAIN OR tested  Comment :  (Above stated is FIM score)    Skilled Therapy Provided: Pt received seated in chair, agreeable to PT. Pt demos sit to/from stand with supervision. Ambulation x75 with CGA. Noted increased kyphotic posture with distance and fatigue.

## 2017-03-31 NOTE — PROGRESS NOTES
[facility]  Progress Note    Jose Silva Patient Status:  Inpatient    10/6/1925 MRN [id number]   [place] 5NW-A Attending Shannan Chavez MD   Hosp Day # 3 PCP Salome Caldera MD     CC: Cough, fever, shortness of breath    SUBJEC 3/28/2017, 3:04.      TECHNIQUE:  PA and lateral chest radiographs were obtained.      PATIENT STATED HISTORY: (As transcribed by Technologist)  Patient adds no additional history.             FINDINGS:     LUNGS:  Stable.  Interstitial opacities diffusely PRN   Or      glucose (DEX4) oral liquid 30 g 30 g Oral Q15 Min PRN   Or      Glucose-Vitamin C (DEX-4) 4-0.006 g chewable tab 8 tablet 8 tablet Oral Q15 Min PRN   insulin aspart (NOVOLOG) 100 UNIT/ML flexpen 2-10 Units 2-10 Units Subcutaneous TID CC and H miralax; dulcolaax suppository prn; improved          Quality:  · DVT Prophylaxis: SCDs, subcutaneous heparin  · CODE status: Full  · Dennis: None    Estimated date of discharge: saturday  Discharge is dependent on: Clinical progress  At this point Mr. Meg Young

## 2017-04-01 PROCEDURE — 99232 SBSQ HOSP IP/OBS MODERATE 35: CPT | Performed by: INTERNAL MEDICINE

## 2017-04-01 NOTE — PROGRESS NOTES
INFECTIOUS DISEASE PROGRESS NOTE    Brian Duncan Patient Status:  Inpatient    10/6/1925 MRN [id number]   [facility] 5NW-A Attendi %   03/31/17 2052 154/88 mmHg - - 75 - 98 %   03/31/17 1942 - - - - - 92 %   03/31/17 1929 (!) 164/82 mmHg 98.7 °F (37.1 °C) Oral 74 20 100 %         General: No acute distress. Alert and oriented x 3. HEENT: no scleral icterus or conjunctival injection. RLL infiltrate although difficult to read in the setting of extensive pulm fibrosis.                 -Presentation seems c/w bacterial pna (cough with purulent sputum and high fevers) in a pt with a recent viral URI, would treat as MRSA pna.    4. pulm fibr

## 2017-04-01 NOTE — PLAN OF CARE
Impaired Functional Mobility    • Achieve highest/safest level of mobility/gait Progressing        Impaired Swallowing    • Minimize aspiration risk Progressing        METABOLIC/FLUID AND ELECTROLYTES - ADULT    • Glucose maintained within prescribed range

## 2017-04-01 NOTE — PROGRESS NOTES
[address] Patient Status:  Inpatient    10/6/1925 MRN [id number]   [facility] 5NW-A Attending Dina Welch MD   Hosp Day # 4 PCP Ki Stauffer MD     Pulm / Critical Care Progress Note     S: Pt feeling a bit bet rhythm, normal S1S2, no murmur. Abdomen: soft, non-tender, non-distended, positive BS. Extremity: no edema       Recent Labs   Lab  03/31/17   0520   PLT  160.0     No results for input(s): INR in the last 72 hours.       Recent Labs   Lab  04/01/17   0

## 2017-04-01 NOTE — PROGRESS NOTES
[facility]  Progress Note    Jose Brian Patient Status:  Inpatient    10/6/1925 MRN [id number]   [facility] 5NW-A Attending Kirsten Reaves MD   [address] Day # 4 PCP Bradley Sheets MD     CC: Cough, fever, shortness of breath    SUBJEC MARY , CT CHEST (IGK=[id number]), 3/28/2017, 3:04.      TECHNIQUE:  PA and lateral chest radiographs were obtained.      PATIENT STATED HISTORY: (As transcribed by Technologist)  Patient adds no additional history.             FINDINGS:     LUNGS:  Stable.  In (DEX4) oral liquid 15 g 15 g Oral Q15 Min PRN   Or      Glucose-Vitamin C (DEX-4) 4-0.006 g chewable tab 4 tablet 4 tablet Oral Q15 Min PRN   Or      dextrose injection 50 mL 50 mL Intravenous Q15 Min PRN   Or      glucose (DEX4) oral liquid 30 g 30 g Oral Sam Sterling  is expected to be discharge to: based on PT OT-24 hour care/supervision;Home with home health PT      Questions/concerns and Plan of care were discussed with patient and son at bedside      Sonali Shelby MD  4/1/2017

## 2017-04-01 NOTE — PROGRESS NOTES
[facility] dosing service    Initial Pharmacokinetic Consult for Vancomycin Dosing     Ricardo Singh is a 80year old male who is being treated for MRSA pneumonia.   Pharmacy has been asked to dose Vancomycin by Dr. Nella Mitchell    He is allergic to levaquin; appreciate the opportunity to assist in his care.     Tesha Cline, PharmD  3/31/2017  7:08 PM  [address] [facility]: [phone]

## 2017-04-02 PROCEDURE — 99232 SBSQ HOSP IP/OBS MODERATE 35: CPT | Performed by: INTERNAL MEDICINE

## 2017-04-02 RX ORDER — AMLODIPINE BESYLATE 2.5 MG/1
2.5 TABLET ORAL DAILY
Status: DISCONTINUED | OUTPATIENT
Start: 2017-04-02 | End: 2017-04-03

## 2017-04-02 NOTE — PROGRESS NOTES
[facility]  Progress Note    Shreya Serrano Patient Status:  Inpatient    10/6/1925 MRN [id number]   [facility] 5NW-A Attending Yee Jones MD   [address] Day # 5 PCP Fabrice Castro MD     CC: Cough, fever, shortness of breath    SUBJEC CHEST AP PORTABLE  (CPT=71010), 3/28/2017, 0:49.  EDWARD , CT CHEST (KIW=[id number]), 3/28/2017, 3:04.      TECHNIQUE:  PA and lateral chest radiographs were obtained.      PATIENT STATED HISTORY: (As transcribed by Technologist)  Patient adds no additional his injection 4 mg 4 mg Intravenous Q6H PRN   glucose (DEX4) oral liquid 15 g 15 g Oral Q15 Min PRN   Or      Glucose-Vitamin C (DEX-4) 4-0.006 g chewable tab 4 tablet 4 tablet Oral Q15 Min PRN   Or      dextrose injection 50 mL 50 mL Intravenous Q15 Min PRN None    Estimated date of discharge:tomorrow  Discharge is dependent on: Clinical progress, ID and pulm clearance; BP controlled  At this point Mr. Nai Diallo  is expected to be discharge to: based on PT OT-24 hour care/supervision;Home with home health PT

## 2017-04-03 ENCOUNTER — TELEPHONE (OUTPATIENT)
Dept: FAMILY MEDICINE CLINIC | Facility: CLINIC | Age: 82
End: 2017-04-03

## 2017-04-03 VITALS
OXYGEN SATURATION: 98 % | SYSTOLIC BLOOD PRESSURE: 138 MMHG | DIASTOLIC BLOOD PRESSURE: 68 MMHG | TEMPERATURE: 97 F | BODY MASS INDEX: 26.12 KG/M2 | HEIGHT: 74 IN | WEIGHT: 203.5 LBS | HEART RATE: 86 BPM | RESPIRATION RATE: 18 BRPM

## 2017-04-03 PROCEDURE — 99239 HOSP IP/OBS DSCHRG MGMT >30: CPT | Performed by: INTERNAL MEDICINE

## 2017-04-03 RX ORDER — AMLODIPINE BESYLATE 2.5 MG/1
2.5 TABLET ORAL EVERY EVENING
Qty: 30 TABLET | Refills: 0 | Status: ON HOLD | OUTPATIENT
Start: 2017-04-03 | End: 2018-01-01

## 2017-04-03 RX ORDER — DOXYCYCLINE HYCLATE 100 MG
100 TABLET ORAL 2 TIMES DAILY
Qty: 10 TABLET | Refills: 0 | Status: SHIPPED | OUTPATIENT
Start: 2017-04-03 | End: 2017-04-08

## 2017-04-03 RX ORDER — FUROSEMIDE 20 MG/1
TABLET ORAL
Status: ON HOLD | COMMUNITY
End: 2018-01-01

## 2017-04-03 NOTE — PHYSICAL THERAPY NOTE
PHYSICAL THERAPY TREATMENT NOTE - INPATIENT    Room Number: 259/108-K     Session: 2   Number of Visits to Meet Established Goals: 5    Presenting Problem:  (Dyspnea, cough,fever, Pulmonary fibrosis)    Problem List  Principal Problem:    Human metapneumo Procedure: ESOPHAGOGASTRODUODENOSCOPY (EGD);   Surgeon: Rey Park MD;  Location: [address] [place] [address]    LAPAROSCOPIC CHOLECYSTECTOMY N/A 9/28/2016    Comment Procedure: LAPAROSCOPIC CHOLECYSTECTOMY;  Surgeon: Juni Johnson MD;  Location: [address] [place] [address] Not tested  Comment :  (Above stated is FIM score)    Skilled Therapy Provided: RN approved session. Pt received seated EOB c PCT. Pt t/f bed >chair c RW and assist from PCT. Pt performed seated therex for BLE, cues for technique.  Pt on 3L NC, pt able to

## 2017-04-03 NOTE — DISCHARGE SUMMARY
[facility] HOSPITALIST  DISCHARGE SUMMARY     Tory Flores Patient Status:  Inpatient    10/6/1925 MRN [id number]   [facility] 5NW-A Attending Jannie Lr MD   [facility] Day # 6 PCP Nicolette Carpio MD     Date of Admission: 3/28/2017  Date of D effusion, prominent mediastinal lymph nodes. He was admitted for suspected pneumonia and pulmonology was consulted. Respiratory viral panel returned positive for metapneumovirus. He continued on Rocephin/clindamycin IV for suspected pneumonia.   Sputum c Instructions Prescription details    furosemide 20 MG Tabs   Commonly known as:  LASIX   What changed:  Another medication with the same name was removed. Continue taking this medication, and follow the directions you see here.         Take 10 mg -20 mg (1/ USE 4 TIMES DAILY    Quantity:  150 each   Refills:  4       ipratropium-albuterol 0.5-2.5 (3) MG/3ML Soln   Last time this was given:  3 mL on 4/3/2017  2:05 PM   Commonly known as:  DUONEB        Take 3 mL by nebulization every 6 (six) hours as neede [facility]    Future Appointments  Date Time Provider Morena Browni   4/6/2017 5:00 PM Nikia Pierce MD EMG [address] EMG [facility]   4/10/2017 10:30 AM ANTHONY Mckeon [address]UL[address]       ---------------------------------------------- IV vancomycin per ID.   regular solids with nectar thick liquids aspiration precautions with upright position, small bites and sips, no stool, slow rate, medications 1 pill at the time and present with thickened fluid, dysphagia therapy.    · Constipation-

## 2017-04-03 NOTE — PROGRESS NOTES
Pulmonary Progress Note      NAME: Hector Soler - ROOM: 9/9- - MRN: [id number] - Age: 80year old - : 10/6/1925    Assessment/Plan:  1. Dyspnea and hypoxia secondary to likely viral pneumonitis with underlying pulmonary fibrosis.  MRSA pneumonia as kg)  BMI 26.12 kg/m2  SpO2 98%  Physical Exam:   General: alert, cooperative, no respiratory distress. HEENT: Normocephalic atraumatic. Lips, mucosa, and tongue normal.  No thrush noted.    Lungs: bibasilar crackles   Chest wall: No tenderness or deformity

## 2017-04-03 NOTE — SLP NOTE
SPEECH DAILY NOTE - INPATIENT    Evaluation Date: 04/03/2017    ASSESSMENT & PLAN   ASSESSMENT  Pt seen sitting up in chair this AM for speech therapy services. Pt cooperative, pleasant, and alert. No family or caregivers present at bedside.  Pt denies comp during all PO intake of solids and liquids with mod verbal and visual cues to clear residue in the valleculae and/or pharynx and to increase base of tongue strength.  IN PROGRESS      Goal #4   Pt will increase tongue base retraction and strengthen pharynge

## 2017-04-03 NOTE — PROGRESS NOTES
[facility] HOSPITAL[facility]  Progress Note     Rosa Manuel Patient Status:  Inpatient    10/6/1925 MRN [id number]   [facility] 5NW-A Attending Belinda De Guzman MD   Hosp Day # 6 PCP Serena Way MD     Chief Complaint: deconditioning    S: Patie Oral Daily   • Atorvastatin Calcium  10 mg Oral Nightly   • Heparin Sodium (Porcine)  5,000 Units Subcutaneous Q8H   • insulin aspart  2-10 Units Subcutaneous TID CC and HS   • ipratropium-albuterol  3 mL Nebulization 4 times per day   • insulin aspart  1- normal  Extremities: extremities normal,no cyanosis or edema  Pulses: 2+ and symmetric  Skin: Skin color, texture, turgor normal. No rashes or lesions  Neurologic: Awake,alert,nonfocal  Psych: Mood and affect appears normal    A/P as above.    1. Cough feve

## 2017-04-03 NOTE — PROGRESS NOTES
[facility]  TEL: [phone]  FAX: [phone]    Jose Silva Patient Status:  Inpatient    10/6/1925 MRN [id number]   [facility] 5NW-A Attending Shannan Chavez MD   [facility] Day # 6 PCP KENNY Canseco seems c/w bacterial pna (cough with purulent sputum and high fevers) in a pt with a recent viral URI, would treat as MRSA pna.    4. pulm fibrosis    PLAN:              -cont IV vanco while here but OK for d/c from my standpoint once ok with other MDs.  Wou

## 2017-04-03 NOTE — TELEPHONE ENCOUNTER
Called pt, spoke to Jalen per Gerard. She stated the pt is being discharged soon and that he is doing much better. She stated his breathing has improved and that the pulmonologist is happy with his progress. Original HFU was on 4.6. 16 however Jalen changed

## 2017-04-04 ENCOUNTER — PATIENT OUTREACH (OUTPATIENT)
Dept: CASE MANAGEMENT | Age: 82
End: 2017-04-04

## 2017-04-04 DIAGNOSIS — J12.3 HUMAN METAPNEUMOVIRUS (HMPV) PNEUMONIA: Primary | ICD-10-CM

## 2017-04-04 DIAGNOSIS — J84.10 PULMONARY FIBROSIS (HCC): ICD-10-CM

## 2017-04-04 NOTE — PROGRESS NOTES
NURSING DISCHARGE NOTE    Discharged Home via Wheelchair. Accompanied by Family member and Support staff  Belongings Taken by patient/family. VSS. Iv discontinued. Discharge instructions given to patient and his daughter.  They verbalized Florencia

## 2017-04-04 NOTE — PROGRESS NOTES
Initial Post Discharge Follow Up   Discharge Date: 4/3/17  Contact Date: 4/4/2017    Consent Verification:  Assessment Completed With: Caregiver: Ankush mackch received per patient?  written  HIPAA Verified? Yes    1.  Tell me why you were in the h Rfl: 4   ipratropium-albuterol (DUONEB) 0.5-2.5 (3) MG/3ML Inhalation Solution Take 3 mL by nebulization every 6 (six) hours as needed.  Disp: 360 mL Rfl: 3   ONETOUCH ULTRASOFT LANCETS Does not apply Misc Test blood glucose 4 times daily Disp: 400 each Rfl care services / DME ordered upon discharge? Home PT, Home RN and Oxygen- HH RN to call for appt today per dtr. Dtr states pt has resumed his normal home O2 of 3L via nasal cannula.       11. Are you able to do normal activities as you did prior to your hos chills/shaking, chest pain, productive cough, difficulty breathing, etc.  She states  has also taught her these sx to monitor for.      Are you experiencing a change in symptoms (shortness of breath, ALMARAZ, wheezing) since your discharge from the hospital?

## 2017-04-04 NOTE — CM/SW NOTE
Patient discharged on 04/03/2017 as previously planned.          04/04/17 0800   Discharge disposition   Discharged to: Home-Health   Name of Facillity/Home Care/Hospice ([facility])   Home services after discharge Skilled home care   Discharge transporta

## 2017-04-05 ENCOUNTER — OFFICE VISIT (OUTPATIENT)
Dept: FAMILY MEDICINE CLINIC | Facility: CLINIC | Age: 82
End: 2017-04-05

## 2017-04-05 VITALS
TEMPERATURE: 98 F | HEART RATE: 82 BPM | RESPIRATION RATE: 16 BRPM | OXYGEN SATURATION: 97 % | DIASTOLIC BLOOD PRESSURE: 68 MMHG | SYSTOLIC BLOOD PRESSURE: 118 MMHG

## 2017-04-05 DIAGNOSIS — J98.01 BRONCHOSPASM: ICD-10-CM

## 2017-04-05 DIAGNOSIS — J84.10 PULMONARY FIBROSIS (HCC): ICD-10-CM

## 2017-04-05 DIAGNOSIS — Z99.81 OXYGEN DEPENDENT: ICD-10-CM

## 2017-04-05 DIAGNOSIS — J18.9 CAP (COMMUNITY ACQUIRED PNEUMONIA): Primary | ICD-10-CM

## 2017-04-05 PROCEDURE — 99495 TRANSJ CARE MGMT MOD F2F 14D: CPT | Performed by: FAMILY MEDICINE

## 2017-04-05 RX ORDER — METHYLPREDNISOLONE 4 MG/1
TABLET ORAL
Qty: 1 KIT | Refills: 0 | Status: SHIPPED | OUTPATIENT
Start: 2017-04-05 | End: 2017-05-11 | Stop reason: ALTCHOICE

## 2017-04-05 NOTE — PROGRESS NOTES
CC: Hospital F/u. HPI:    Brian Duncan is a 80year old male here today for hospital follow up.    He was discharged from Inpatient hospital, [facility] to Home   Admission Date: 3/28/17   Discharge Date: 4/3/17  Hospital Discharge Diagnosis:Pneumo mouth every evening. furosemide 20 MG Oral Tab Take 10 mg -20 mg (1/2 to 1 tablet) daily as needed   CARVEDILOL 3.125 MG Oral Tab Take 1 tablet (3.125 mg total) by mouth 2 (two) times daily with meals.    simvastatin 20 MG Oral Tab Take 1 tablet (20 mg to Coronary atherosclerosis; High cholesterol; Cancer ([address] Cataract; Osteoarthritis; Muscle weakness; BPH (benign prostatic hyperplasia); Deep vein thrombosis ([address] Esophageal reflux; Anesthesia complication; and Arthritis.     He  has past surgical history chest tenderness  LUNGS: clear to auscultation  CARDIO: RRR without murmur  GI: good BS's, no masses, HSM or tenderness  MUSCULOSKELETAL: back is not tender, FROM of the extremities  EXTREMITIES: no cyanosis, clubbing or edema  NEURO: Oriented times three,

## 2017-04-10 ENCOUNTER — TELEPHONE (OUTPATIENT)
Dept: FAMILY MEDICINE CLINIC | Facility: CLINIC | Age: 82
End: 2017-04-10

## 2017-04-10 NOTE — TELEPHONE ENCOUNTER
Christiano Brumfield from [facility] called to inform Dr. Nicole Rockwell that the pt fell off his bed on Saturday night. He has a bruise on his LT ear but everything else was OK. Pt has an appt tomorrow 4/11.  Call back number in case it's needded for Christiano Brumfield i

## 2017-04-11 ENCOUNTER — OFFICE VISIT (OUTPATIENT)
Dept: FAMILY MEDICINE CLINIC | Facility: CLINIC | Age: 82
End: 2017-04-11

## 2017-04-11 ENCOUNTER — HOSPITAL ENCOUNTER (OUTPATIENT)
Dept: GENERAL RADIOLOGY | Age: 82
Discharge: HOME | End: 2017-04-11
Attending: FAMILY MEDICINE
Payer: MEDICARE

## 2017-04-11 ENCOUNTER — TELEPHONE (OUTPATIENT)
Dept: FAMILY MEDICINE CLINIC | Facility: CLINIC | Age: 82
End: 2017-04-11

## 2017-04-11 VITALS
SYSTOLIC BLOOD PRESSURE: 108 MMHG | HEART RATE: 82 BPM | WEIGHT: 203 LBS | BODY MASS INDEX: 26 KG/M2 | TEMPERATURE: 98 F | DIASTOLIC BLOOD PRESSURE: 74 MMHG | RESPIRATION RATE: 16 BRPM

## 2017-04-11 DIAGNOSIS — J98.11 ATELECTASIS: Primary | ICD-10-CM

## 2017-04-11 DIAGNOSIS — J18.9 CAP (COMMUNITY ACQUIRED PNEUMONIA): ICD-10-CM

## 2017-04-11 DIAGNOSIS — J98.01 BRONCHOSPASM: ICD-10-CM

## 2017-04-11 DIAGNOSIS — J84.10 PULMONARY FIBROSIS (HCC): ICD-10-CM

## 2017-04-11 DIAGNOSIS — Z99.81 OXYGEN DEPENDENT: ICD-10-CM

## 2017-04-11 DIAGNOSIS — J18.9 CAP (COMMUNITY ACQUIRED PNEUMONIA): Primary | ICD-10-CM

## 2017-04-11 PROCEDURE — 71020 XR CHEST PA + LAT CHEST (CPT=71020): CPT

## 2017-04-11 PROCEDURE — 99214 OFFICE O/P EST MOD 30 MIN: CPT | Performed by: FAMILY MEDICINE

## 2017-04-11 NOTE — PATIENT INSTRUCTIONS
Pneumonia (Adult)  Pneumonia is an infection deep within the lungs. It is in the small air sacs (alveoli). Pneumonia may be caused by a virus or bacteria. Pneumonia caused by bacteria is usually treated with an antibiotic.  Severe cases may need to be shravan If you are 72 or older, you should get a pneumococcal vaccine and a yearly flu (influenza) shot. You should also get these vaccines if you have chronic lung disease like asthma, emphysema, or COPD. Ask your provider about this.   When to seek medical advice

## 2017-04-11 NOTE — PROGRESS NOTES
Patient presents with:   Follow - Up  Fall: Frankey Adams out of bed saturday evening hit head shoulder brusied stomach       HPI:   Max Alejo is a 80year old male who presents for pneumonia f/u from the hospital.  Cough is improving, now mild,intermittent, pt WHEEZING Disp: 25.5 g Rfl: 3   Glucose Blood (ONETOUCH ULTRA BLUE) In Vitro Strip Test blood glucose 4 times daily Disp: 400 each Rfl: 1   escitalopram 10 MG Oral Tab Take 1 tablet (10 mg total) by mouth daily.  Disp: 90 tablet Rfl: 3   Pantoprazole Sodium 04/25/2016    Comment cystoscopy- dr. Fong Rued  9/16    Comment pancreas cysts; cystic duct stones    EGD N/A 9/20/2016    Comment Procedure: ESOPHAGOGASTRODUODENOSCOPY (EGD);   Surgeon: Ar Ford MD;  Location: [facility] ENDOSCO sx's persist or worsen.

## 2017-04-11 NOTE — TELEPHONE ENCOUNTER
----- Message from Mirna Kelly MD sent at 4/11/2017 12:47 PM CDT -----  Looks more like atelectasies not pneumonia. OK to recheck CXR in 2 weeks. No new meds, thank you.

## 2017-04-12 NOTE — TELEPHONE ENCOUNTER
Called patient's daughter and spoke with her per HIPAA. Advised her of results and POC below. She states understanding. Future xray order placed in Epic.

## 2017-04-28 ENCOUNTER — TELEPHONE (OUTPATIENT)
Dept: FAMILY MEDICINE CLINIC | Facility: CLINIC | Age: 82
End: 2017-04-28

## 2017-04-28 NOTE — TELEPHONE ENCOUNTER
Received [facility] orders for home health and request for face to face. Forms completed and given to [facility] to fax.

## 2017-05-02 ENCOUNTER — MED REC SCAN ONLY (OUTPATIENT)
Dept: FAMILY MEDICINE CLINIC | Facility: CLINIC | Age: 82
End: 2017-05-02

## 2017-05-11 ENCOUNTER — LAB ENCOUNTER (OUTPATIENT)
Dept: LAB | Age: 82
End: 2017-05-11
Attending: FAMILY MEDICINE
Payer: MEDICARE

## 2017-05-11 ENCOUNTER — TELEPHONE (OUTPATIENT)
Dept: FAMILY MEDICINE CLINIC | Facility: CLINIC | Age: 82
End: 2017-05-11

## 2017-05-11 ENCOUNTER — HOSPITAL ENCOUNTER (OUTPATIENT)
Dept: GENERAL RADIOLOGY | Age: 82
Discharge: HOME | End: 2017-05-11
Attending: FAMILY MEDICINE
Payer: MEDICARE

## 2017-05-11 ENCOUNTER — OFFICE VISIT (OUTPATIENT)
Dept: FAMILY MEDICINE CLINIC | Facility: CLINIC | Age: 82
End: 2017-05-11

## 2017-05-11 VITALS
SYSTOLIC BLOOD PRESSURE: 110 MMHG | OXYGEN SATURATION: 98 % | HEART RATE: 72 BPM | TEMPERATURE: 99 F | WEIGHT: 205 LBS | RESPIRATION RATE: 22 BRPM | BODY MASS INDEX: 26 KG/M2 | DIASTOLIC BLOOD PRESSURE: 70 MMHG

## 2017-05-11 DIAGNOSIS — R35.0 FREQUENCY OF URINATION: Primary | ICD-10-CM

## 2017-05-11 DIAGNOSIS — R06.02 SOB (SHORTNESS OF BREATH): ICD-10-CM

## 2017-05-11 PROCEDURE — 99214 OFFICE O/P EST MOD 30 MIN: CPT | Performed by: FAMILY MEDICINE

## 2017-05-11 PROCEDURE — 71020 XR CHEST PA + LAT CHEST (CPT=71020): CPT | Performed by: FAMILY MEDICINE

## 2017-05-11 PROCEDURE — 85025 COMPLETE CBC W/AUTO DIFF WBC: CPT

## 2017-05-11 PROCEDURE — 87077 CULTURE AEROBIC IDENTIFY: CPT | Performed by: FAMILY MEDICINE

## 2017-05-11 PROCEDURE — 87186 SC STD MICRODIL/AGAR DIL: CPT | Performed by: FAMILY MEDICINE

## 2017-05-11 PROCEDURE — 87086 URINE CULTURE/COLONY COUNT: CPT | Performed by: FAMILY MEDICINE

## 2017-05-11 PROCEDURE — 80053 COMPREHEN METABOLIC PANEL: CPT

## 2017-05-11 PROCEDURE — 81003 URINALYSIS AUTO W/O SCOPE: CPT | Performed by: FAMILY MEDICINE

## 2017-05-11 PROCEDURE — 36415 COLL VENOUS BLD VENIPUNCTURE: CPT

## 2017-05-11 RX ORDER — DOXYCYCLINE HYCLATE 100 MG/1
CAPSULE ORAL
Qty: 8 CAPSULE | Refills: 0 | Status: SHIPPED | OUTPATIENT
Start: 2017-05-11 | End: 2018-01-01

## 2017-05-11 NOTE — PROGRESS NOTES
Dustin Martinez is a 80year old male presents with SOB. HPI:  The patient complaints of SOB, moderate, started: few days ago, worse with physical activities and better with rest.  Not new symptoms for the pt but worse. No cold, no cough, no wheezing. blood glucose 4 times daily Disp: 400 each Rfl: 1   escitalopram 10 MG Oral Tab Take 1 tablet (10 mg total) by mouth daily. Disp: 90 tablet Rfl: 3   Pantoprazole Sodium 40 MG Oral Tab EC Take 1 tablet (40 mg total) by mouth daily.  Disp: 90 tablet Rfl: 3 pancreas cysts; cystic duct stones    EGD N/A 9/20/2016    Comment Procedure: ESOPHAGOGASTRODUODENOSCOPY (EGD);   Surgeon: Jamal Hooker MD;  Location: [facility] ENDOSCOPY    LAPAROSCOPIC CHOLECYSTECTOMY N/A 9/28/2016    Comment Procedure: LAPAROSCOPIC PERNELL

## 2017-05-11 NOTE — TELEPHONE ENCOUNTER
Spoke with patients daughter Jennifer Mckenzie and notified Re:Chest Xray result and antibiotic was ordered instructions were given. Dr. Kathleen Dudley also ordered patient to double water pill for 2 days and these instructions were also given will f/u in 1 week.

## 2017-05-12 ENCOUNTER — TELEPHONE (OUTPATIENT)
Dept: FAMILY MEDICINE CLINIC | Facility: CLINIC | Age: 82
End: 2017-05-12

## 2017-05-12 NOTE — TELEPHONE ENCOUNTER
Spoke to patient's daughter Altagracia Paz per HIPAA. Advised of results below. She states understanding. States no further questions.

## 2017-05-15 ENCOUNTER — TELEPHONE (OUTPATIENT)
Dept: FAMILY MEDICINE CLINIC | Facility: CLINIC | Age: 82
End: 2017-05-15

## 2017-05-15 NOTE — TELEPHONE ENCOUNTER
Called patient and spoke with daughter per HIPAA. Advised her of results and POC below. She states understanding.

## 2017-05-22 ENCOUNTER — OFFICE VISIT (OUTPATIENT)
Dept: FAMILY MEDICINE CLINIC | Facility: CLINIC | Age: 82
End: 2017-05-22

## 2017-05-22 ENCOUNTER — TELEPHONE (OUTPATIENT)
Dept: FAMILY MEDICINE CLINIC | Facility: CLINIC | Age: 82
End: 2017-05-22

## 2017-05-22 VITALS
BODY MASS INDEX: 26 KG/M2 | OXYGEN SATURATION: 97 % | WEIGHT: 205 LBS | RESPIRATION RATE: 22 BRPM | DIASTOLIC BLOOD PRESSURE: 70 MMHG | TEMPERATURE: 99 F | HEART RATE: 88 BPM | SYSTOLIC BLOOD PRESSURE: 116 MMHG

## 2017-05-22 DIAGNOSIS — N30.00 ACUTE CYSTITIS WITHOUT HEMATURIA: ICD-10-CM

## 2017-05-22 DIAGNOSIS — J84.10 PULMONARY FIBROSIS (HCC): ICD-10-CM

## 2017-05-22 DIAGNOSIS — Z99.81 OXYGEN DEPENDENT: ICD-10-CM

## 2017-05-22 PROCEDURE — 99214 OFFICE O/P EST MOD 30 MIN: CPT | Performed by: FAMILY MEDICINE

## 2017-05-22 PROCEDURE — 87086 URINE CULTURE/COLONY COUNT: CPT | Performed by: FAMILY MEDICINE

## 2017-05-22 PROCEDURE — 81003 URINALYSIS AUTO W/O SCOPE: CPT | Performed by: FAMILY MEDICINE

## 2017-05-22 PROCEDURE — 87077 CULTURE AEROBIC IDENTIFY: CPT | Performed by: FAMILY MEDICINE

## 2017-05-22 PROCEDURE — 87186 SC STD MICRODIL/AGAR DIL: CPT | Performed by: FAMILY MEDICINE

## 2017-05-22 NOTE — PROGRESS NOTES
Joon Clayton is a 80year old male presents with SOB, pulmonary fibrosis. HPI:  The patient complaints of SOB, mild started: few weeks ago, worse with physical activities and better with rest and oxygen, pt uses 3L. Pt will benefit from portable O2. WHEEZING Disp: 25.5 g Rfl: 3   Glucose Blood (ONETOUCH ULTRA BLUE) In Vitro Strip Test blood glucose 4 times daily Disp: 400 each Rfl: 1   escitalopram 10 MG Oral Tab Take 1 tablet (10 mg total) by mouth daily.  Disp: 90 tablet Rfl: 3   Pantoprazole Sodium 04/25/2016    Comment cystoscopy- dr. Erick Harmon  9/16    Comment pancreas cysts; cystic duct stones    EGD N/A 9/20/2016    Comment Procedure: ESOPHAGOGASTRODUODENOSCOPY (EGD);   Surgeon: Ashley Bird MD;  Location: [address] [place] [address]

## 2017-05-22 NOTE — TELEPHONE ENCOUNTER
[facility] would like a verbal to get pt a conserving device

## 2017-05-22 NOTE — TELEPHONE ENCOUNTER
Floresita Loomis LM with [employer] answering service, to have Brooke Montano call the office back to discuss what the pt needs.

## 2017-05-24 ENCOUNTER — TELEPHONE (OUTPATIENT)
Dept: FAMILY MEDICINE CLINIC | Facility: CLINIC | Age: 82
End: 2017-05-24

## 2017-05-24 NOTE — TELEPHONE ENCOUNTER
----- Message from Nicolette Carpio MD sent at 5/24/2017  4:12 PM CDT -----  Please call the lab if they can check if the bug is sensitive to Doxycycline. Thank you.

## 2017-05-24 NOTE — TELEPHONE ENCOUNTER
Called [facility] and spoke with [employer]. She advised that sensitivity will be ran for this. Please await sensitivity results. Thank you!

## 2017-05-25 ENCOUNTER — TELEPHONE (OUTPATIENT)
Dept: FAMILY MEDICINE CLINIC | Facility: CLINIC | Age: 82
End: 2017-05-25

## 2017-05-25 ENCOUNTER — HOSPITAL ENCOUNTER (EMERGENCY)
Age: 82
Discharge: HOME | End: 2017-05-25
Attending: EMERGENCY MEDICINE
Payer: MEDICARE

## 2017-05-25 VITALS
TEMPERATURE: 99 F | BODY MASS INDEX: 26.31 KG/M2 | OXYGEN SATURATION: 99 % | SYSTOLIC BLOOD PRESSURE: 144 MMHG | WEIGHT: 205 LBS | RESPIRATION RATE: 20 BRPM | HEART RATE: 76 BPM | HEIGHT: 74 IN | DIASTOLIC BLOOD PRESSURE: 60 MMHG

## 2017-05-25 DIAGNOSIS — R82.79 POSITIVE URINE CULTURE: Primary | ICD-10-CM

## 2017-05-25 PROCEDURE — 99284 EMERGENCY DEPT VISIT MOD MDM: CPT

## 2017-05-25 PROCEDURE — 81003 URINALYSIS AUTO W/O SCOPE: CPT | Performed by: EMERGENCY MEDICINE

## 2017-05-25 PROCEDURE — 80053 COMPREHEN METABOLIC PANEL: CPT | Performed by: EMERGENCY MEDICINE

## 2017-05-25 PROCEDURE — 96365 THER/PROPH/DIAG IV INF INIT: CPT

## 2017-05-25 PROCEDURE — 85025 COMPLETE CBC W/AUTO DIFF WBC: CPT | Performed by: EMERGENCY MEDICINE

## 2017-05-25 RX ORDER — GENTAMICIN SULFATE 80 MG/100ML
80 INJECTION, SOLUTION INTRAVENOUS ONCE
Status: COMPLETED | OUTPATIENT
Start: 2017-05-25 | End: 2017-05-25

## 2017-05-25 NOTE — TELEPHONE ENCOUNTER
Notes Recorded by Allen Candelaria MD on 5/25/2017 at 11:29 AM  We need to send pt to ID, Dr. Chantelle Neff or urology this week, pt has Pseudomonas. No response to antibiotics that pt is non allergic too.   Notes Recorded by Georgie Dumont RN on 5/25/2017 at 1

## 2017-05-25 NOTE — TELEPHONE ENCOUNTER
----- Message from Bernarda Carlos MD sent at 5/25/2017 11:29 AM CDT -----  We need to send pt to ID, Dr. Isaias Guerrero or urology this week, pt has Pseudomonas. No response to antibiotics that pt is non allergic too.

## 2017-05-25 NOTE — TELEPHONE ENCOUNTER
Spoke with Dr. Kristy Martel who advised that patient should go to ER for IV antibiotics. Called patient and spoke with daughter per HIPAA. Advised her of above information. She states understanding. She will take patient to [address] [place] [address].   Advised her to

## 2017-05-26 NOTE — ED NOTES
REPORT FROM [facility] RN. PT RESTING ON CART WITH FAMILY AT BEDSIDE.  PT DENIES NEW OR WORSENING C/O.

## 2017-05-26 NOTE — ED PROVIDER NOTES
Patient Seen in: [facility] Emergency Department In [place]    History   Patient presents with:  Urinary Symptoms (urologic)    Stated Complaint: urinary symptoms    HPI    80-year-old male presents to the emergency department for possible IV antibiotics. pancreas cysts; cystic duct stones    EGD N/A 9/20/2016    Comment Procedure: ESOPHAGOGASTRODUODENOSCOPY (EGD);   Surgeon: Mulu Garcia MD;  Location: [address] [place] [address]    LAPAROSCOPIC CHOLECYSTECTOMY N/A 9/28/2016    Comment Procedure: LAPAROSCOPIC PERNELL sodium 100 MG Oral Cap,  Take 100 mg by mouth 2 (two) times daily. B Complex-C (SUPER B COMPLEX OR),  Take 1 tablet by mouth daily. aspirin 81 MG Oral Tab,  Take 81 mg by mouth daily.          Family History   Problem Relation Age of Onset   • Cancer Fa Nursing note and vitals reviewed.            ED Course     Labs Reviewed   COMP METABOLIC PANEL (14) - Abnormal; Notable for the following:     Glucose 137 (*)     BUN 31 (*)     GFR 51 (*)     AST 52 (*)     Albumin 2.9 (*)     All other components withi possible for a visit        Medications Prescribed:  Discharge Medication List as of 5/25/2017  7:54 PM

## 2017-07-19 NOTE — TELEPHONE ENCOUNTER
Form received from [place] for patient's DM testing supplies. Form completed and signed by provider. Form faxed back as requested. Form sent to scanning.

## 2017-10-09 NOTE — TELEPHONE ENCOUNTER
Form received via fax from pt pharmacy/medicare requesting information regarding pt DM dx for DM supplies. Form faxed back with OV note and copy of most recent script. Faxed back to [employer] at [phone]. Confirmation received.

## 2017-10-13 NOTE — TELEPHONE ENCOUNTER
Medication(s) to Refill:   Pending Prescriptions Disp Refills    PANTOPRAZOLE SODIUM 40 MG Oral Tab EC [Pharmacy Med Name: PANTOPRAZOLE SOD DR TABS 40MG] 90 tablet 3     Sig: TAKE 1 TABLET DAILY           Last Office Visit with PCP: 5/22/2017         No fu

## 2017-11-18 NOTE — TELEPHONE ENCOUNTER
Medication(s) to Refill:   Pending Prescriptions Disp Refills    CARVEDILOL 3.125 MG Oral Tab [Pharmacy Med Name: Carvedilol Oral Tablet 3.125 MG] 60 tablet 2     Sig: TAKE ONE TABLET BY MOUTH TWICE DAILY WITH MEALS           Last Time Medication was [facility]

## 2017-12-11 NOTE — TELEPHONE ENCOUNTER
Medication(s) to Refill:   Pending Prescriptions Disp Refills    ESCITALOPRAM 10 MG Oral Tab [Pharmacy Med Name: ESCITALOPRAM TABS 10MG] 90 tablet 3     Sig: TAKE 1 TABLET DAILY      SIMVASTATIN 20 MG Oral Tab [Pharmacy Med Name: SIMVASTATIN TABS 20MG] 90

## 2017-12-22 ENCOUNTER — PRIOR ORIGINAL RECORDS (OUTPATIENT)
Dept: OTHER | Age: 82
End: 2017-12-22

## 2018-01-01 ENCOUNTER — APPOINTMENT (OUTPATIENT)
Dept: GENERAL RADIOLOGY | Facility: HOSPITAL | Age: 83
End: 2018-01-01
Payer: MEDICARE

## 2018-01-01 ENCOUNTER — APPOINTMENT (OUTPATIENT)
Dept: GENERAL RADIOLOGY | Facility: HOSPITAL | Age: 83
DRG: 308 | End: 2018-01-01
Attending: INTERNAL MEDICINE
Payer: MEDICARE

## 2018-01-01 ENCOUNTER — MED REC SCAN ONLY (OUTPATIENT)
Dept: FAMILY MEDICINE CLINIC | Facility: CLINIC | Age: 83
End: 2018-01-01

## 2018-01-01 ENCOUNTER — APPOINTMENT (OUTPATIENT)
Dept: LAB | Age: 83
End: 2018-01-01
Attending: FAMILY MEDICINE
Payer: MEDICARE

## 2018-01-01 ENCOUNTER — APPOINTMENT (OUTPATIENT)
Dept: CV DIAGNOSTICS | Facility: HOSPITAL | Age: 83
DRG: 308 | End: 2018-01-01
Attending: INTERNAL MEDICINE
Payer: MEDICARE

## 2018-01-01 ENCOUNTER — APPOINTMENT (OUTPATIENT)
Dept: GENERAL RADIOLOGY | Facility: HOSPITAL | Age: 83
DRG: 308 | End: 2018-01-01
Attending: EMERGENCY MEDICINE
Payer: MEDICARE

## 2018-01-01 ENCOUNTER — APPOINTMENT (OUTPATIENT)
Dept: GENERAL RADIOLOGY | Facility: HOSPITAL | Age: 83
DRG: 308 | End: 2018-01-01
Attending: HOSPITALIST
Payer: MEDICARE

## 2018-01-01 ENCOUNTER — OFFICE VISIT (OUTPATIENT)
Dept: FAMILY MEDICINE CLINIC | Facility: CLINIC | Age: 83
End: 2018-01-01

## 2018-01-01 ENCOUNTER — TELEPHONE (OUTPATIENT)
Dept: FAMILY MEDICINE CLINIC | Facility: CLINIC | Age: 83
End: 2018-01-01

## 2018-01-01 ENCOUNTER — APPOINTMENT (OUTPATIENT)
Dept: GENERAL RADIOLOGY | Facility: HOSPITAL | Age: 83
DRG: 308 | End: 2018-01-01
Attending: PHYSICIAN ASSISTANT
Payer: MEDICARE

## 2018-01-01 ENCOUNTER — APPOINTMENT (OUTPATIENT)
Dept: GENERAL RADIOLOGY | Facility: HOSPITAL | Age: 83
End: 2018-01-01
Attending: INTERNAL MEDICINE
Payer: MEDICARE

## 2018-01-01 ENCOUNTER — HOSPITAL ENCOUNTER (INPATIENT)
Facility: HOSPITAL | Age: 83
LOS: 1 days | Discharge: HOME HEALTH | DRG: 178 | End: 2018-01-01
Attending: EMERGENCY MEDICINE | Admitting: INTERNAL MEDICINE
Payer: MEDICARE

## 2018-01-01 ENCOUNTER — HOSPITAL ENCOUNTER (INPATIENT)
Facility: HOSPITAL | Age: 83
LOS: 7 days | DRG: 308 | End: 2018-01-01
Attending: EMERGENCY MEDICINE | Admitting: HOSPITALIST
Payer: MEDICARE

## 2018-01-01 ENCOUNTER — APPOINTMENT (OUTPATIENT)
Dept: CT IMAGING | Facility: HOSPITAL | Age: 83
DRG: 178 | End: 2018-01-01
Attending: EMERGENCY MEDICINE
Payer: MEDICARE

## 2018-01-01 ENCOUNTER — PATIENT OUTREACH (OUTPATIENT)
Dept: CASE MANAGEMENT | Age: 83
End: 2018-01-01

## 2018-01-01 ENCOUNTER — APPOINTMENT (OUTPATIENT)
Dept: GENERAL RADIOLOGY | Facility: HOSPITAL | Age: 83
DRG: 178 | End: 2018-01-01
Attending: EMERGENCY MEDICINE
Payer: MEDICARE

## 2018-01-01 ENCOUNTER — HOSPITAL ENCOUNTER (OUTPATIENT)
Facility: HOSPITAL | Age: 83
Setting detail: OBSERVATION
Discharge: HOME HEALTH | End: 2018-01-01
Attending: EMERGENCY MEDICINE | Admitting: INTERNAL MEDICINE
Payer: MEDICARE

## 2018-01-01 VITALS
HEIGHT: 74 IN | WEIGHT: 200.63 LBS | HEART RATE: 79 BPM | OXYGEN SATURATION: 97 % | RESPIRATION RATE: 20 BRPM | BODY MASS INDEX: 25.75 KG/M2 | TEMPERATURE: 97 F | SYSTOLIC BLOOD PRESSURE: 142 MMHG | DIASTOLIC BLOOD PRESSURE: 72 MMHG

## 2018-01-01 VITALS
SYSTOLIC BLOOD PRESSURE: 144 MMHG | HEIGHT: 74 IN | HEART RATE: 91 BPM | OXYGEN SATURATION: 100 % | BODY MASS INDEX: 25.99 KG/M2 | RESPIRATION RATE: 17 BRPM | WEIGHT: 202.5 LBS | DIASTOLIC BLOOD PRESSURE: 84 MMHG | TEMPERATURE: 98 F

## 2018-01-01 VITALS
BODY MASS INDEX: 24.73 KG/M2 | RESPIRATION RATE: 18 BRPM | TEMPERATURE: 98 F | OXYGEN SATURATION: 74 % | DIASTOLIC BLOOD PRESSURE: 69 MMHG | HEIGHT: 74 IN | SYSTOLIC BLOOD PRESSURE: 92 MMHG | HEART RATE: 80 BPM | WEIGHT: 192.69 LBS

## 2018-01-01 VITALS
OXYGEN SATURATION: 97 % | DIASTOLIC BLOOD PRESSURE: 76 MMHG | RESPIRATION RATE: 24 BRPM | HEIGHT: 74 IN | SYSTOLIC BLOOD PRESSURE: 118 MMHG | BODY MASS INDEX: 26.31 KG/M2 | HEART RATE: 84 BPM | WEIGHT: 205 LBS | TEMPERATURE: 99 F

## 2018-01-01 DIAGNOSIS — I48.91 ATRIAL FIBRILLATION WITH RAPID VENTRICULAR RESPONSE (HCC): Primary | ICD-10-CM

## 2018-01-01 DIAGNOSIS — J84.10 PULMONARY FIBROSIS (HCC): ICD-10-CM

## 2018-01-01 DIAGNOSIS — Z02.9 ENCOUNTERS FOR ADMINISTRATIVE PURPOSE: ICD-10-CM

## 2018-01-01 DIAGNOSIS — J44.1 COPD EXACERBATION (HCC): ICD-10-CM

## 2018-01-01 DIAGNOSIS — R19.7 DIARRHEA, UNSPECIFIED TYPE: ICD-10-CM

## 2018-01-01 DIAGNOSIS — Z99.81 OXYGEN DEPENDENT: ICD-10-CM

## 2018-01-01 DIAGNOSIS — J18.9 COMMUNITY ACQUIRED PNEUMONIA, UNSPECIFIED LATERALITY: ICD-10-CM

## 2018-01-01 DIAGNOSIS — R53.1 WEAKNESS: Primary | ICD-10-CM

## 2018-01-01 DIAGNOSIS — J90 PLEURAL EFFUSION: ICD-10-CM

## 2018-01-01 DIAGNOSIS — J44.1 COPD WITH ACUTE EXACERBATION (HCC): ICD-10-CM

## 2018-01-01 DIAGNOSIS — E03.9 HYPOTHYROIDISM, UNSPECIFIED TYPE: ICD-10-CM

## 2018-01-01 DIAGNOSIS — E55.9 VITAMIN D DEFICIENCY: Primary | ICD-10-CM

## 2018-01-01 DIAGNOSIS — R06.89 HYPERCAPNIA: Primary | ICD-10-CM

## 2018-01-01 DIAGNOSIS — J98.01 BRONCHOSPASM: ICD-10-CM

## 2018-01-01 DIAGNOSIS — R10.9 ABDOMINAL PAIN OF UNKNOWN ETIOLOGY: ICD-10-CM

## 2018-01-01 DIAGNOSIS — J69.0 ASPIRATION PNEUMONITIS (HCC): Primary | ICD-10-CM

## 2018-01-01 PROCEDURE — 82607 VITAMIN B-12: CPT | Performed by: FAMILY MEDICINE

## 2018-01-01 PROCEDURE — 84443 ASSAY THYROID STIM HORMONE: CPT | Performed by: FAMILY MEDICINE

## 2018-01-01 PROCEDURE — 99223 1ST HOSP IP/OBS HIGH 75: CPT | Performed by: INTERNAL MEDICINE

## 2018-01-01 PROCEDURE — 85025 COMPLETE CBC W/AUTO DIFF WBC: CPT | Performed by: FAMILY MEDICINE

## 2018-01-01 PROCEDURE — 74176 CT ABD & PELVIS W/O CONTRAST: CPT | Performed by: EMERGENCY MEDICINE

## 2018-01-01 PROCEDURE — 36415 COLL VENOUS BLD VENIPUNCTURE: CPT | Performed by: FAMILY MEDICINE

## 2018-01-01 PROCEDURE — 99239 HOSP IP/OBS DSCHRG MGMT >30: CPT | Performed by: HOSPITALIST

## 2018-01-01 PROCEDURE — 99232 SBSQ HOSP IP/OBS MODERATE 35: CPT | Performed by: HOSPITALIST

## 2018-01-01 PROCEDURE — 99225 SUBSEQUENT OBSERVATION CARE: CPT | Performed by: INTERNAL MEDICINE

## 2018-01-01 PROCEDURE — 1111F DSCHRG MED/CURRENT MED MERGE: CPT | Performed by: FAMILY MEDICINE

## 2018-01-01 PROCEDURE — 71045 X-RAY EXAM CHEST 1 VIEW: CPT | Performed by: PHYSICIAN ASSISTANT

## 2018-01-01 PROCEDURE — 71045 X-RAY EXAM CHEST 1 VIEW: CPT

## 2018-01-01 PROCEDURE — 99496 TRANSJ CARE MGMT HIGH F2F 7D: CPT | Performed by: FAMILY MEDICINE

## 2018-01-01 PROCEDURE — 71045 X-RAY EXAM CHEST 1 VIEW: CPT | Performed by: INTERNAL MEDICINE

## 2018-01-01 PROCEDURE — 99233 SBSQ HOSP IP/OBS HIGH 50: CPT | Performed by: HOSPITALIST

## 2018-01-01 PROCEDURE — 99217 OBSERVATION CARE DISCHARGE: CPT | Performed by: STUDENT IN AN ORGANIZED HEALTH CARE EDUCATION/TRAINING PROGRAM

## 2018-01-01 PROCEDURE — 99225 SUBSEQUENT OBSERVATION CARE: CPT | Performed by: STUDENT IN AN ORGANIZED HEALTH CARE EDUCATION/TRAINING PROGRAM

## 2018-01-01 PROCEDURE — 82306 VITAMIN D 25 HYDROXY: CPT | Performed by: FAMILY MEDICINE

## 2018-01-01 PROCEDURE — 71046 X-RAY EXAM CHEST 2 VIEWS: CPT | Performed by: INTERNAL MEDICINE

## 2018-01-01 PROCEDURE — 71045 X-RAY EXAM CHEST 1 VIEW: CPT | Performed by: EMERGENCY MEDICINE

## 2018-01-01 PROCEDURE — 93306 TTE W/DOPPLER COMPLETE: CPT | Performed by: INTERNAL MEDICINE

## 2018-01-01 PROCEDURE — 99223 1ST HOSP IP/OBS HIGH 75: CPT | Performed by: HOSPITALIST

## 2018-01-01 PROCEDURE — 71045 X-RAY EXAM CHEST 1 VIEW: CPT | Performed by: HOSPITALIST

## 2018-01-01 PROCEDURE — 99220 INITIAL OBSERVATION CARE,LEVL III: CPT | Performed by: INTERNAL MEDICINE

## 2018-01-01 PROCEDURE — 5A09357 ASSISTANCE WITH RESPIRATORY VENTILATION, LESS THAN 24 CONSECUTIVE HOURS, CONTINUOUS POSITIVE AIRWAY PRESSURE: ICD-10-PCS | Performed by: HOSPITALIST

## 2018-01-01 RX ORDER — PREDNISONE 10 MG/1
TABLET ORAL
Qty: 22 TABLET | Refills: 0 | Status: SHIPPED | OUTPATIENT
Start: 2018-01-01

## 2018-01-01 RX ORDER — ATORVASTATIN CALCIUM 10 MG/1
10 TABLET, FILM COATED ORAL NIGHTLY
Status: DISCONTINUED | OUTPATIENT
Start: 2018-01-01 | End: 2018-01-01

## 2018-01-01 RX ORDER — CEFUROXIME AXETIL 500 MG/1
500 TABLET ORAL 2 TIMES DAILY
Status: ON HOLD | COMMUNITY
End: 2018-01-01

## 2018-01-01 RX ORDER — ESCITALOPRAM OXALATE 10 MG/1
10 TABLET ORAL DAILY
Status: DISCONTINUED | OUTPATIENT
Start: 2018-01-01 | End: 2018-01-01

## 2018-01-01 RX ORDER — DEXTROSE MONOHYDRATE 25 G/50ML
50 INJECTION, SOLUTION INTRAVENOUS
Status: DISCONTINUED | OUTPATIENT
Start: 2018-01-01 | End: 2018-01-01

## 2018-01-01 RX ORDER — AMLODIPINE BESYLATE 2.5 MG/1
2.5 TABLET ORAL EVERY EVENING
Status: DISCONTINUED | OUTPATIENT
Start: 2018-01-01 | End: 2018-01-01

## 2018-01-01 RX ORDER — IPRATROPIUM BROMIDE AND ALBUTEROL SULFATE 2.5; .5 MG/3ML; MG/3ML
3 SOLUTION RESPIRATORY (INHALATION) ONCE
Status: COMPLETED | OUTPATIENT
Start: 2018-01-01 | End: 2018-01-01

## 2018-01-01 RX ORDER — POLYETHYLENE GLYCOL 3350 17 G/17G
17 POWDER, FOR SOLUTION ORAL DAILY
Status: DISCONTINUED | OUTPATIENT
Start: 2018-01-01 | End: 2018-01-01

## 2018-01-01 RX ORDER — HEPARIN SODIUM 5000 [USP'U]/ML
5000 INJECTION, SOLUTION INTRAVENOUS; SUBCUTANEOUS EVERY 12 HOURS SCHEDULED
Status: DISCONTINUED | OUTPATIENT
Start: 2018-01-01 | End: 2018-01-01

## 2018-01-01 RX ORDER — MAGNESIUM SULFATE HEPTAHYDRATE 40 MG/ML
2 INJECTION, SOLUTION INTRAVENOUS ONCE
Status: COMPLETED | OUTPATIENT
Start: 2018-01-01 | End: 2018-01-01

## 2018-01-01 RX ORDER — SODIUM CHLORIDE 9 MG/ML
INJECTION, SOLUTION INTRAVENOUS CONTINUOUS
Status: DISCONTINUED | OUTPATIENT
Start: 2018-01-01 | End: 2018-01-01

## 2018-01-01 RX ORDER — METOCLOPRAMIDE HYDROCHLORIDE 5 MG/ML
10 INJECTION INTRAMUSCULAR; INTRAVENOUS EVERY 8 HOURS PRN
Status: DISCONTINUED | OUTPATIENT
Start: 2018-01-01 | End: 2018-01-01

## 2018-01-01 RX ORDER — POLYETHYLENE GLYCOL 3350 17 G/17G
17 POWDER, FOR SOLUTION ORAL DAILY
COMMUNITY

## 2018-01-01 RX ORDER — FUROSEMIDE 10 MG/ML
20 INJECTION INTRAMUSCULAR; INTRAVENOUS
Status: DISCONTINUED | OUTPATIENT
Start: 2018-01-01 | End: 2018-01-01

## 2018-01-01 RX ORDER — ASPIRIN 81 MG/1
81 TABLET, CHEWABLE ORAL DAILY
Status: DISCONTINUED | OUTPATIENT
Start: 2018-01-01 | End: 2018-01-01

## 2018-01-01 RX ORDER — ASPIRIN 81 MG/1
81 TABLET ORAL DAILY
Status: DISCONTINUED | OUTPATIENT
Start: 2018-01-01 | End: 2018-01-01

## 2018-01-01 RX ORDER — ONDANSETRON 2 MG/ML
4 INJECTION INTRAMUSCULAR; INTRAVENOUS EVERY 6 HOURS PRN
Status: DISCONTINUED | OUTPATIENT
Start: 2018-01-01 | End: 2018-01-01

## 2018-01-01 RX ORDER — POLYETHYLENE GLYCOL 3350 17 G/17G
17 POWDER, FOR SOLUTION ORAL DAILY PRN
Status: DISCONTINUED | OUTPATIENT
Start: 2018-01-01 | End: 2018-01-01

## 2018-01-01 RX ORDER — SCOLOPAMINE TRANSDERMAL SYSTEM 1 MG/1
1 PATCH, EXTENDED RELEASE TRANSDERMAL
Status: DISCONTINUED | OUTPATIENT
Start: 2018-01-01 | End: 2018-01-01

## 2018-01-01 RX ORDER — CARVEDILOL 3.12 MG/1
3.12 TABLET ORAL 2 TIMES DAILY WITH MEALS
COMMUNITY
End: 2018-01-01

## 2018-01-01 RX ORDER — PEN NEEDLE, DIABETIC 31 GX5/16"
NEEDLE, DISPOSABLE MISCELLANEOUS
Qty: 150 EACH | Refills: 3 | Status: SHIPPED | OUTPATIENT
Start: 2018-01-01

## 2018-01-01 RX ORDER — PANTOPRAZOLE SODIUM 40 MG/1
40 TABLET, DELAYED RELEASE ORAL
COMMUNITY

## 2018-01-01 RX ORDER — SODIUM CHLORIDE 9 MG/ML
INJECTION, SOLUTION INTRAVENOUS ONCE
Status: COMPLETED | OUTPATIENT
Start: 2018-01-01 | End: 2018-01-01

## 2018-01-01 RX ORDER — CLINDAMYCIN HYDROCHLORIDE 150 MG/1
450 CAPSULE ORAL 3 TIMES DAILY
Qty: 45 CAPSULE | Refills: 0 | Status: ON HOLD | OUTPATIENT
Start: 2018-01-01 | End: 2018-01-01

## 2018-01-01 RX ORDER — ACETAMINOPHEN 325 MG/1
650 TABLET ORAL EVERY 6 HOURS PRN
Status: DISCONTINUED | OUTPATIENT
Start: 2018-01-01 | End: 2018-01-01

## 2018-01-01 RX ORDER — DILTIAZEM HYDROCHLORIDE 240 MG/1
240 CAPSULE, COATED, EXTENDED RELEASE ORAL DAILY
Qty: 30 CAPSULE | Refills: 6 | Status: SHIPPED | OUTPATIENT
Start: 2018-01-01

## 2018-01-01 RX ORDER — AZITHROMYCIN 250 MG/1
500 TABLET, FILM COATED ORAL ONCE
Status: DISCONTINUED | OUTPATIENT
Start: 2018-01-01 | End: 2018-01-01

## 2018-01-01 RX ORDER — FUROSEMIDE 20 MG/1
10 TABLET ORAL DAILY
Qty: 15 TABLET | Refills: 3 | Status: SHIPPED | OUTPATIENT
Start: 2018-01-01

## 2018-01-01 RX ORDER — HEPARIN SODIUM 5000 [USP'U]/ML
5000 INJECTION INTRAVENOUS; SUBCUTANEOUS ONCE
Status: COMPLETED | OUTPATIENT
Start: 2018-01-01 | End: 2018-01-01

## 2018-01-01 RX ORDER — FUROSEMIDE 10 MG/ML
40 INJECTION INTRAMUSCULAR; INTRAVENOUS ONCE
Status: COMPLETED | OUTPATIENT
Start: 2018-01-01 | End: 2018-01-01

## 2018-01-01 RX ORDER — POTASSIUM CHLORIDE 20 MEQ/1
40 TABLET, EXTENDED RELEASE ORAL ONCE
Status: COMPLETED | OUTPATIENT
Start: 2018-01-01 | End: 2018-01-01

## 2018-01-01 RX ORDER — FLUTICASONE PROPIONATE 50 MCG
1 SPRAY, SUSPENSION (ML) NASAL DAILY
Status: DISCONTINUED | OUTPATIENT
Start: 2018-01-01 | End: 2018-01-01

## 2018-01-01 RX ORDER — CARVEDILOL 3.12 MG/1
3.12 TABLET ORAL 2 TIMES DAILY WITH MEALS
Status: DISCONTINUED | OUTPATIENT
Start: 2018-01-01 | End: 2018-01-01

## 2018-01-01 RX ORDER — HYDROMORPHONE HYDROCHLORIDE 1 MG/ML
1.2 INJECTION, SOLUTION INTRAMUSCULAR; INTRAVENOUS; SUBCUTANEOUS EVERY 2 HOUR PRN
Status: DISCONTINUED | OUTPATIENT
Start: 2018-01-01 | End: 2018-01-01

## 2018-01-01 RX ORDER — MAGNESIUM OXIDE 400 MG (241.3 MG MAGNESIUM) TABLET
1 TABLET NIGHTLY PRN
Status: DISCONTINUED | OUTPATIENT
Start: 2018-01-01 | End: 2018-01-01

## 2018-01-01 RX ORDER — ERGOCALCIFEROL 1.25 MG/1
50000 CAPSULE ORAL WEEKLY
Qty: 4 CAPSULE | Refills: 3 | Status: SHIPPED | OUTPATIENT
Start: 2018-01-01 | End: 2018-11-01

## 2018-01-01 RX ORDER — LORAZEPAM 2 MG/ML
2 INJECTION INTRAMUSCULAR
Status: DISCONTINUED | OUTPATIENT
Start: 2018-01-01 | End: 2018-01-01

## 2018-01-01 RX ORDER — HEPARIN SODIUM 5000 [USP'U]/ML
5000 INJECTION, SOLUTION INTRAVENOUS; SUBCUTANEOUS EVERY 8 HOURS SCHEDULED
Status: DISCONTINUED | OUTPATIENT
Start: 2018-01-01 | End: 2018-01-01

## 2018-01-01 RX ORDER — PANTOPRAZOLE SODIUM 40 MG/1
40 TABLET, DELAYED RELEASE ORAL
Status: DISCONTINUED | OUTPATIENT
Start: 2018-01-01 | End: 2018-01-01

## 2018-01-01 RX ORDER — SODIUM CHLORIDE, SODIUM LACTATE, POTASSIUM CHLORIDE, CALCIUM CHLORIDE 600; 310; 30; 20 MG/100ML; MG/100ML; MG/100ML; MG/100ML
1000 INJECTION, SOLUTION INTRAVENOUS ONCE
Status: COMPLETED | OUTPATIENT
Start: 2018-01-01 | End: 2018-01-01

## 2018-01-01 RX ORDER — LEVOTHYROXINE SODIUM 0.05 MG/1
50 TABLET ORAL
Status: DISCONTINUED | OUTPATIENT
Start: 2018-01-01 | End: 2018-01-01

## 2018-01-01 RX ORDER — LORAZEPAM 2 MG/ML
0.5 INJECTION INTRAMUSCULAR EVERY 4 HOURS PRN
Status: DISCONTINUED | OUTPATIENT
Start: 2018-01-01 | End: 2018-01-01

## 2018-01-01 RX ORDER — METHYLPREDNISOLONE SODIUM SUCCINATE 125 MG/2ML
80 INJECTION, POWDER, LYOPHILIZED, FOR SOLUTION INTRAMUSCULAR; INTRAVENOUS EVERY 8 HOURS
Status: COMPLETED | OUTPATIENT
Start: 2018-01-01 | End: 2018-01-01

## 2018-01-01 RX ORDER — PEN NEEDLE, DIABETIC 31 GX5/16"
NEEDLE, DISPOSABLE MISCELLANEOUS
Qty: 150 EACH | Refills: 3 | Status: SHIPPED | OUTPATIENT
Start: 2018-01-01 | End: 2018-01-01

## 2018-01-01 RX ORDER — DILTIAZEM HYDROCHLORIDE 60 MG/1
60 TABLET, FILM COATED ORAL EVERY 6 HOURS SCHEDULED
Status: DISCONTINUED | OUTPATIENT
Start: 2018-01-01 | End: 2018-01-01

## 2018-01-01 RX ORDER — HEPARIN SODIUM AND DEXTROSE 10000; 5 [USP'U]/100ML; G/100ML
1000 INJECTION INTRAVENOUS ONCE
Status: COMPLETED | OUTPATIENT
Start: 2018-01-01 | End: 2018-01-01

## 2018-01-01 RX ORDER — CLINDAMYCIN HYDROCHLORIDE 150 MG/1
450 CAPSULE ORAL 3 TIMES DAILY
Qty: 45 CAPSULE | Refills: 0 | Status: SHIPPED | OUTPATIENT
Start: 2018-01-01 | End: 2018-01-01

## 2018-01-01 RX ORDER — HEPARIN SODIUM AND DEXTROSE 10000; 5 [USP'U]/100ML; G/100ML
INJECTION INTRAVENOUS CONTINUOUS
Status: DISCONTINUED | OUTPATIENT
Start: 2018-01-01 | End: 2018-01-01

## 2018-01-01 RX ORDER — DOCUSATE SODIUM 100 MG/1
100 CAPSULE, LIQUID FILLED ORAL 2 TIMES DAILY
Status: DISCONTINUED | OUTPATIENT
Start: 2018-01-01 | End: 2018-01-01

## 2018-01-01 RX ORDER — LORAZEPAM 2 MG/ML
INJECTION INTRAMUSCULAR
Status: DISCONTINUED
Start: 2018-01-01 | End: 2018-01-01

## 2018-01-01 RX ORDER — PREDNISONE 10 MG/1
TABLET ORAL
Qty: 30 TABLET | Refills: 0 | Status: SHIPPED | OUTPATIENT
Start: 2018-01-01 | End: 2018-01-01 | Stop reason: ALTCHOICE

## 2018-01-01 RX ORDER — FUROSEMIDE 20 MG/1
20 TABLET ORAL
Status: DISCONTINUED | OUTPATIENT
Start: 2018-01-01 | End: 2018-01-01

## 2018-01-01 RX ORDER — LORAZEPAM 2 MG/ML
1 INJECTION INTRAMUSCULAR
Status: DISCONTINUED | OUTPATIENT
Start: 2018-01-01 | End: 2018-01-01

## 2018-01-01 RX ORDER — SODIUM PHOSPHATE, DIBASIC AND SODIUM PHOSPHATE, MONOBASIC 7; 19 G/133ML; G/133ML
1 ENEMA RECTAL ONCE AS NEEDED
Status: DISCONTINUED | OUTPATIENT
Start: 2018-01-01 | End: 2018-01-01

## 2018-01-01 RX ORDER — ECHINACEA PURPUREA EXTRACT 125 MG
1 TABLET ORAL
Status: DISCONTINUED | OUTPATIENT
Start: 2018-01-01 | End: 2018-01-01

## 2018-01-01 RX ORDER — PREDNISONE 20 MG/1
40 TABLET ORAL
Status: DISCONTINUED | OUTPATIENT
Start: 2018-01-01 | End: 2018-01-01

## 2018-01-01 RX ORDER — LEVOTHYROXINE SODIUM 0.05 MG/1
50 TABLET ORAL
Qty: 30 TABLET | Refills: 2 | Status: SHIPPED | OUTPATIENT
Start: 2018-01-01

## 2018-01-01 RX ORDER — MAGNESIUM OXIDE 400 MG (241.3 MG MAGNESIUM) TABLET
1 TABLET NIGHTLY PRN
Qty: 30 TABLET | Refills: 0 | Status: SHIPPED | OUTPATIENT
Start: 2018-01-01

## 2018-01-01 RX ORDER — ESCITALOPRAM OXALATE 10 MG/1
10 TABLET ORAL
Status: DISCONTINUED | OUTPATIENT
Start: 2018-01-01 | End: 2018-01-01

## 2018-01-01 RX ORDER — METHYLPREDNISOLONE SODIUM SUCCINATE 125 MG/2ML
80 INJECTION, POWDER, LYOPHILIZED, FOR SOLUTION INTRAMUSCULAR; INTRAVENOUS EVERY 8 HOURS
Status: DISCONTINUED | OUTPATIENT
Start: 2018-01-01 | End: 2018-01-01

## 2018-01-01 RX ORDER — METOCLOPRAMIDE HYDROCHLORIDE 5 MG/ML
5 INJECTION INTRAMUSCULAR; INTRAVENOUS EVERY 8 HOURS PRN
Status: DISCONTINUED | OUTPATIENT
Start: 2018-01-01 | End: 2018-01-01

## 2018-01-01 RX ORDER — DILTIAZEM HYDROCHLORIDE 60 MG/1
60 TABLET, FILM COATED ORAL AS NEEDED
Status: DISCONTINUED | OUTPATIENT
Start: 2018-01-01 | End: 2018-01-01

## 2018-01-01 RX ORDER — METHYLPREDNISOLONE SODIUM SUCCINATE 125 MG/2ML
125 INJECTION, POWDER, LYOPHILIZED, FOR SOLUTION INTRAMUSCULAR; INTRAVENOUS ONCE
Status: COMPLETED | OUTPATIENT
Start: 2018-01-01 | End: 2018-01-01

## 2018-01-01 RX ORDER — IPRATROPIUM BROMIDE AND ALBUTEROL SULFATE 2.5; .5 MG/3ML; MG/3ML
3 SOLUTION RESPIRATORY (INHALATION) EVERY 4 HOURS PRN
Status: DISCONTINUED | OUTPATIENT
Start: 2018-01-01 | End: 2018-01-01

## 2018-01-01 RX ORDER — LIDOCAINE HYDROCHLORIDE 20 MG/ML
20 JELLY TOPICAL ONCE
Status: COMPLETED | OUTPATIENT
Start: 2018-01-01 | End: 2018-01-01

## 2018-01-01 RX ORDER — ESCITALOPRAM OXALATE 10 MG/1
10 TABLET ORAL DAILY
COMMUNITY

## 2018-01-01 RX ORDER — HYDROMORPHONE HYDROCHLORIDE 1 MG/ML
0.8 INJECTION, SOLUTION INTRAMUSCULAR; INTRAVENOUS; SUBCUTANEOUS EVERY 2 HOUR PRN
Status: DISCONTINUED | OUTPATIENT
Start: 2018-01-01 | End: 2018-01-01

## 2018-01-01 RX ORDER — BISACODYL 10 MG
10 SUPPOSITORY, RECTAL RECTAL
Status: DISCONTINUED | OUTPATIENT
Start: 2018-01-01 | End: 2018-01-01

## 2018-01-01 RX ORDER — AZITHROMYCIN 500 MG/1
500 TABLET, FILM COATED ORAL DAILY
Qty: 1 TABLET | Refills: 0 | Status: ON HOLD | OUTPATIENT
Start: 2018-01-01 | End: 2018-01-01

## 2018-01-01 RX ORDER — IPRATROPIUM BROMIDE AND ALBUTEROL SULFATE 2.5; .5 MG/3ML; MG/3ML
3 SOLUTION RESPIRATORY (INHALATION) EVERY 6 HOURS PRN
Status: DISCONTINUED | OUTPATIENT
Start: 2018-01-01 | End: 2018-01-01

## 2018-01-01 RX ORDER — DILTIAZEM HYDROCHLORIDE 240 MG/1
240 CAPSULE, COATED, EXTENDED RELEASE ORAL DAILY
Status: DISCONTINUED | OUTPATIENT
Start: 2018-01-01 | End: 2018-01-01

## 2018-01-01 RX ORDER — HYDROMORPHONE HYDROCHLORIDE 1 MG/ML
0.4 INJECTION, SOLUTION INTRAMUSCULAR; INTRAVENOUS; SUBCUTANEOUS EVERY 2 HOUR PRN
Status: DISCONTINUED | OUTPATIENT
Start: 2018-01-01 | End: 2018-01-01

## 2018-01-01 RX ORDER — HYDROMORPHONE HYDROCHLORIDE 1 MG/ML
INJECTION, SOLUTION INTRAMUSCULAR; INTRAVENOUS; SUBCUTANEOUS
Status: DISCONTINUED
Start: 2018-01-01 | End: 2018-01-01

## 2018-01-01 RX ORDER — DIGOXIN 0.25 MG/ML
250 INJECTION INTRAMUSCULAR; INTRAVENOUS ONCE
Status: COMPLETED | OUTPATIENT
Start: 2018-01-01 | End: 2018-01-01

## 2018-01-04 ENCOUNTER — PRIOR ORIGINAL RECORDS (OUTPATIENT)
Dept: OTHER | Age: 83
End: 2018-01-04

## 2018-01-10 ENCOUNTER — PRIOR ORIGINAL RECORDS (OUTPATIENT)
Dept: OTHER | Age: 83
End: 2018-01-10

## 2018-02-15 NOTE — TELEPHONE ENCOUNTER
Fax received from [address] to continue pt's home o2.   Form completed & faxed back to [facility] at [phone] along with LOV notes per request.

## 2018-04-03 PROBLEM — J44.1 COPD WITH ACUTE EXACERBATION (HCC): Status: ACTIVE | Noted: 2018-01-01

## 2018-04-03 PROBLEM — R06.89 HYPERCAPNIA: Status: ACTIVE | Noted: 2018-01-01

## 2018-04-03 PROBLEM — R06.89 HYPERCARBIA: Status: ACTIVE | Noted: 2018-01-01

## 2018-04-03 NOTE — H&P
MARY HOSPITALIST  History and Physical     Dsutin Martinez Patient Status:  Emergency    10/6/1925 MRN [id number]   Location [address] [facility] Attending Yuval Ramirez MD   Hosp Day # 0 PCP Yamilex Jean-Baptiste MD     Chief Complaint: SOB Procedure: ESOPHAGOGASTRODUODENOSCOPY (EGD);                  Surgeon: Vinh Tinajero MD;  Location: [facility]                ENDOSCOPY  9/16: ENDOSCOPIC ULTRASOUND EXAM      Comment: pancreas cysts; cystic duct stones  No date: HERNIA SURGERY  No date: HIP R Disp: 100 each Rfl: 3   Doxycycline Hyclate 100 MG Oral Cap First day BID then once a day for next 6 days.  Disp: 8 capsule Rfl: 0   Insulin Lispro Prot & Lispro (HUMALOG MIX 50/50 KWIKPEN) (50-50) 100 UNIT/ML Subcutaneous Suspension Pen-injector At meals: 3.  HEENT: Normocephalic atraumatic. Moist mucous membranes. EOM-I. PERRLA. Anicteric. Neck: No lymphadenopathy. No JVD. No carotid bruits. Respiratory: distant sounds bilaterally, poor air exchange, some crackles on left. No wheezes. No rhonchi.   Cardio DNR  · Dennis: no    Plan of care discussed with pt and nurse    Lynne De Anda MD  4/3/2018

## 2018-04-03 NOTE — ED PROVIDER NOTES
Patient Seen in: [facility] Emergency Department    History   Patient presents with:  Dyspnea KRIS SOB (respiratory)  Fatigue (constitutional, neurologic)    Stated Complaint: kris, shivering, weakness    HPI    80-year-old male, medical history as no SURGERY  No date: HIP REPLACEMENT SURGERY      Comment: lt hip  9/28/2016: LAPAROSCOPIC CHOLECYSTECTOMY N/A      Comment: Procedure: LAPAROSCOPIC CHOLECYSTECTOMY;                 Surgeon: Rob Bonner MD;  Location: [address] [place] [address]  04/25/2016: Labs Reviewed   COMP METABOLIC PANEL (14) - Abnormal; Notable for the following:        Result Value    Glucose 171 (*)     BUN 31 (*)     Creatinine 1.43 (*)     GFR, Non- 42 (*)     GFR, -American 49 (*)     Albumin 2.8 (*) 100% on 3 L nasal cannula, this is likely not helping, I have turned his oxygen down to 2 L to try and target his pulse ox to about 90-93%.         Admission disposition: 4/3/2018  4:47 PM           Disposition and Plan     Clinical Impression:  Hypercapnia

## 2018-04-04 NOTE — CONSULTS
Pulmonary H&P/Consult       NAME: Salena 56: 737/101-W - MRN: [id number] - Age: 80year old - :  10/6/1925    Date of Admission: 4/3/2018  2:29 PM  Admission Diagnosis: Hypercapnia [R06.89]  COPD with acute exacerbation [facility] [J44.1]  Reason f tract infection)       Past Surgical History:  No date: APPENDECTOMY  No date: BACK SURGERY  9/20/2016: EGD N/A      Comment: Procedure: ESOPHAGOGASTRODUODENOSCOPY (EGD);                  Surgeon: Jerrica Nichole MD;  Location: [address] [place] [address] Disp: 30 tablet Rfl: 6 4/2/2018 at Unknown time   ipratropium-albuterol (DUONEB) 0.5-2.5 (3) MG/3ML Inhalation Solution Take 3 mL by nebulization every 6 (six) hours as needed.  Disp: 360 mL Rfl: 3 4/3/2018 at Unknown time   Insulin Glargine (Quintella Spruce Oral Tab EC Take 40 mg by mouth every morning before breakfast. Disp:  Rfl:    Albuterol Sulfate HFA (PROAIR HFA) 108 (90 Base) MCG/ACT Inhalation Aero Soln USE 2 INHALATIONS EVERY 4 HOURS AS NEEDED FOR WHEEZING Disp: 25.5 g Rfl: 3   Insulin Lispro Prot & Daily   • Insulin Aspart Pen  1-10 Units Subcutaneous TID AC and HS     Continuous Infusing Medication:    PRN Medication:acetaminophen, ipratropium-albuterol, PEG 3350, magnesium hydroxide, bisacodyl, FLEET ENEMA, ondansetron HCl, glucose **OR** Glucose-V Appearance:    Alert, cooperative, no distress, appears stated age   Head:    Normocephalic, without obvious abnormality, atraumatic   Eyes:    PERRL, conjunctiva/corneas clear, EOM's intact, both eyes   Throat:   Lips, mucosa, and tongue normal; teeth and fibrosis vs bronchitis  -weaned to baseline O2  2. Pulmonary Fibrosis  -most likely occupational (former  and )  -cont IV steroids today, taper to oral tomorrow, plan for short 5 day burst  3.  Chronic Resp Failure  -CO2 retention not

## 2018-04-04 NOTE — PROGRESS NOTES
MARY HOSPITALIST  Progress Note     Jose Elias Lo Patient Status:  Observation    10/6/1925 MRN [id number]   [facility] 5NW-A Attending Howard Welch MD   Hosp Day # 0 PCP Domo Hoffmann MD     Chief Complaint: cough    S: Patient on 3 Besylate  2.5 mg Oral QPM   • aspirin  81 mg Oral Daily   • carvedilol  3.125 mg Oral BID with meals   • escitalopram  10 mg Oral Daily   • Pantoprazole Sodium  40 mg Oral Daily   • atorvastatin  10 mg Oral Nightly   • insulin detemir  10 Units Subcutaneou

## 2018-04-04 NOTE — PROGRESS NOTES
04/04/18 0850   Clinical Encounter Type   Referral To   (POLST form, dated 4/6/15 scanned into patient's electronic record.   to remain available at pager [phone].)

## 2018-04-04 NOTE — PHYSICAL THERAPY NOTE
PHYSICAL THERAPY EVALUATION - INPATIENT     Room Number: 515/515-A  Evaluation Date: 4/4/2018  Type of Evaluation: Initial  Physician Order: PT Eval and Treat    Presenting Problem: hypercarbia  Reason for Therapy: Mobility Dysfunction and Discharge ENDOSCOPY  9/16: ENDOSCOPIC ULTRASOUND EXAM      Comment: pancreas cysts; cystic duct stones  No date: HERNIA SURGERY  No date: HIP REPLACEMENT SURGERY      Comment: lt hip  9/28/2016: LAPAROSCOPIC CHOLECYSTECTOMY N/A      Comment: Procedure: Sameer Penaloza ACTIVITY TOLERANCE  Room air  No shortness of breath    AM-PAC '6-Clicks' INPATIENT SHORT FORM - BASIC MOBILITY  How much difficulty does the patient currently have. ..  -   Turning over in bed (including adjusting bedclothes, sheets and blankets)?: personal factors impacting therapy include h/o pulmonary fibrosis. In this PT evaluation, the patient presents with the following impairments decreased activity tolerance, balance, and safety. Functional outcome measures completed include AMPAC.   Based o

## 2018-04-04 NOTE — OCCUPATIONAL THERAPY NOTE
OCCUPATIONAL THERAPY EVALUATION - INPATIENT     Room Number: 515/515-A  Evaluation Date: 4/4/2018  Type of Evaluation: Initial  Presenting Problem: SOB    Physician Order: IP Consult to Occupational Therapy  Reason for Therapy: ADL/IADL Dysfunction and Dis ULTRASOUND EXAM      Comment: pancreas cysts; cystic duct stones  No date: HERNIA SURGERY  No date: HIP REPLACEMENT SURGERY      Comment: lt hip  9/28/2016: LAPAROSCOPIC CHOLECYSTECTOMY N/A      Comment: Procedure: LAPAROSCOPIC CHOLECYSTECTOMY; strength is within functional limits     COORDINATION  Gross Motor    WFL    Fine Motor    WFL        ACTIVITY TOLERANCE   Liters of O2:  3    ACTIVITIES OF DAILY LIVING ASSESSMENT  AM-PAC ‘6-Clicks’ Inpatient Daily Activity Short Form  How much help from These deficits impact the patient’s ability to participate in ADL and transfers, rest and sleep, leisure and social participation.       The patient is functioning below his previous functional level and would benefit from skilled inpatient OT to address th

## 2018-04-04 NOTE — PLAN OF CARE
Diabetes/Glucose Control    • Glucose maintained within prescribed range Progressing        METABOLIC/FLUID AND ELECTROLYTES - ADULT    • Glucose maintained within prescribed range Progressing        MUSCULOSKELETAL - ADULT    • Return mobility to safest l

## 2018-04-05 NOTE — PLAN OF CARE
Diabetes/Glucose Control    • Glucose maintained within prescribed range Progressing        Impaired Activities of Daily Living    • Achieve highest/safest level of independence in self care Progressing        Impaired Functional Mobility    • Achieve high discharge plan: Discharge back to home with [facility]    Patient identify as high risk for readmission at this time. Patient is AO x 4. Denies pain/discomfort. Maintains O2 sats on 3 LPM which is baseline at rest but needed 8L with ambulation.  Patient has goo

## 2018-04-05 NOTE — PLAN OF CARE
Diabetes/Glucose Control    • Glucose maintained within prescribed range Progressing        Impaired Activities of Daily Living    • Achieve highest/safest level of independence in self care Progressing        Impaired Functional Mobility    • Achieve high discharge plan: Discharge to home with [facility]    Patient identified as high risk for readmission at this time. Patient is AO x 4. Maintains O2 sats on 4 L at this time. Denies pain/discomfort. Continent. Took all due medications.  Blood glucose monitored &

## 2018-04-05 NOTE — PLAN OF CARE
Diabetes/Glucose Control    • Glucose maintained within prescribed range Not Progressing        Impaired Activities of Daily Living    • Achieve highest/safest level of independence in self care Not Progressing        Impaired Functional Mobility    • Achi

## 2018-04-05 NOTE — PROGRESS NOTES
[facility] [address] Patient Status:  Observation    10/6/1925 MRN [id number]   [facility] 5NW-A Attending Keyon Rodriguez MD   Hosp Day # 0 PCP Mirna Kelly MD     Pulm / Critical Care Progress Note     S: pt states he is fe non-distended, positive BS. Extremity: no edema       Recent Labs   Lab  04/03/18   1517  04/04/18   0620   WBC  10.2  5.3   HGB  13.1  13.7   HCT  42.2  43.4   PLT  199.0  169.0     No results for input(s): INR in the last 72 hours.       Recent Labs   L

## 2018-04-05 NOTE — DISCHARGE SUMMARY
[facility] HOSPITALIST  DISCHARGE SUMMARY     Richard Nieves Patient Status:  Observation    10/6/1925 MRN [id number]   [facility] 5NW-A Attending Rashard Marcelino MD   Hosp Day # 0 PCP Michael Briones MD     Date of Admission: 4/3/2018  Date of pulmonology. Pneumonia was ruled out and abx were stopped. The pt was treated with oral steroids  For COPD exacerbation with clinical improvement. D/C when cleared by consultants.     Procedures during hospitalization:   • no    Incidental or significant f nebulization every 6 (six) hours as needed.    Quantity:  360 mL  Refills:  3     Pantoprazole Sodium 40 MG Tbec  Commonly known as:  PROTONIX      Take 40 mg by mouth every morning before breakfast.   Refills:  0     simvastatin 20 MG Tabs  Commonly known

## 2018-04-05 NOTE — OCCUPATIONAL THERAPY NOTE
OCCUPATIONAL THERAPY TREATMENT NOTE - INPATIENT     Room Number: 389/367-L  Session: 1   Number of Visits to Meet Established Goals: 5    Presenting Problem: SOB    History related to current admission: Pt admitted 4/3/2018 for SOB, per pulmonologist fibro SURGERY      Comment: lt hip  9/28/2016: LAPAROSCOPIC CHOLECYSTECTOMY N/A      Comment: Procedure: LAPAROSCOPIC CHOLECYSTECTOMY;                 Surgeon: Celso Pool MD;  Location: [facility] MAIN               OR  04/25/2016: [address] [place] [address] bedside chair SBA via RW, cueing for RW safety needed. Chair transfer supervision. RN aware of O2 sats. Patient End of Session: Up in chair;Needs met;Call light within reach;RN aware of session/findings; All patient questions and concerns addressed    ASSE

## 2018-04-06 NOTE — PROGRESS NOTES
MARY HOSPITALIST  Progress Note     Ricardo Singh Patient Status:  Observation    10/6/1925 MRN [id number]   [facility] 5NW-A Attending Tristan Meneds MD   Hosp Day # 0 PCP Freida Dupree MD     Chief Complaint: cough    S: Patient doin carvedilol  3.125 mg Oral BID with meals   • escitalopram  10 mg Oral Daily   • Pantoprazole Sodium  40 mg Oral Daily   • atorvastatin  10 mg Oral Nightly   • Insulin Aspart Pen  1-10 Units Subcutaneous TID AC and HS       ASSESSMENT / PLAN:     1.  Acute C

## 2018-04-06 NOTE — PROGRESS NOTES
MARY HOSPITALIST  Progress Note     April Norris Patient Status:  Observation    10/6/1925 MRN [id number]   [facility] 5NW-A Attending Sami Maloney MD   Hosp Day # 0 PCP Bernarda Carlos MD     Chief Complaint: cough    S: Patient doin Oral Daily   • carvedilol  3.125 mg Oral BID with meals   • escitalopram  10 mg Oral Daily   • Pantoprazole Sodium  40 mg Oral Daily   • atorvastatin  10 mg Oral Nightly   • Insulin Aspart Pen  1-10 Units Subcutaneous TID AC and HS       ASSESSMENT / PLAN:

## 2018-04-06 NOTE — PROGRESS NOTES
SPO2 % ON O2 3 LITERS PER MINUTE 93%    SPO2% AMBULATION ON O2 3 LITERS PER MINUTE 84%    SPO2% AMBULATION ON O2 6 LITERS PER MINUTE  91 %

## 2018-04-06 NOTE — PLAN OF CARE
Diabetes/Glucose Control    • Glucose maintained within prescribed range Progressing        Impaired Activities of Daily Living    • Achieve highest/safest level of independence in self care Progressing        Impaired Functional Mobility    • Achieve high Bronchitis     Pulmonary fibrosis (HCC)     Type 2 diabetes mellitus without complication, without long-term current use of insulin (HCC)     Essential hypertension     Bronchospasm with bronchitis, acute     Human metapneumovirus (hMPV) pneumonia     CAP

## 2018-04-06 NOTE — PROGRESS NOTES
[address] Patient Status:  Observation    10/6/1925 MRN [id number]   [facility] 5NW-A Attending Tammy Rodgers MD   Hosp Day # 0 PCP Thais Batres MD     Pulm / Critical Care Progress Note     S: pt states he feels deformity. Heart: Regular rate and rhythm, normal S1S2, no murmur. Abdomen: soft, non-tender, non-distended, positive BS.    Extremity: no edema         Recent Labs   Lab  04/03/18   1517  04/04/18   0620   WBC  10.2  5.3   HGB  13.1  13.7   HCT  42.2

## 2018-04-07 NOTE — PROGRESS NOTES
NURSING DISCHARGE NOTE    Discharged Home via Wheelchair. Accompanied by Family member and Support staff  Belongings Taken by patient/family. Patient discharged home with daughter, home O2 and HCC.  All discharge instructions, medications and follow

## 2018-04-09 NOTE — PROGRESS NOTES
Initial Post Discharge Follow Up   Discharge Date: 4/6/18  Contact Date: 4/9/2018    Consent Verification:  Assessment Completed With: Other: Disha Bowens received per patient?  written  HIPAA Verified?   Yes    Discharge Dx:  Acute COPD exacerbation daily. Disp:  Rfl:    Pantoprazole Sodium 40 MG Oral Tab EC Take 40 mg by mouth every morning before breakfast. Disp:  Rfl:    Albuterol Sulfate HFA (PROAIR HFA) 108 (90 Base) MCG/ACT Inhalation Aero Soln USE 2 INHALATIONS EVERY 4 HOURS AS NEEDED FOR [facility] you having any concerns with constipation? No    Referrals/orders at D/C:  Home Health ordered at D/C? Yes   Has HH been set up? Yes   If Yes: With Whom: [facility]   When: RN/PT initial visit was on Saturday- 4/7/18. DME ordered at D/C? Yes   What? scheduled an apt with Dr. Viji Anglin on 4/19/18. Dtr denies any barriers to keeping/getting to HFU appts.       Your appointments     Date & Time Appointment Department [facility])    Apr 10, 2018 12:40 PM CDT Hospital/SNF F/U with Tristan Osei MD [facility]

## 2018-04-10 PROBLEM — I50.42 CHRONIC COMBINED SYSTOLIC AND DIASTOLIC CONGESTIVE HEART FAILURE (HCC): Status: ACTIVE | Noted: 2018-01-01

## 2018-04-10 PROBLEM — D69.6 THROMBOCYTOPENIA (HCC): Chronic | Status: ACTIVE | Noted: 2018-01-01

## 2018-04-10 NOTE — TELEPHONE ENCOUNTER
Spoke with Darin Lea at Dr. Dennise Blanc office Re: enrique Rx. Carvedilol and side effects with SOB and fatigue Dr. Oracio Del Angel request call back Re: this matter.

## 2018-04-10 NOTE — PROGRESS NOTES
CC: hospital f/u      HPI:    Zunilda Villatoro is a 80year old male here today for hospital follow up.    He was discharged from Inpatient hospital, [facility] to Home   Admission Date: 4/3/18   Discharge Date: 4/6/18  Hospital Discharge Diagnoses (since 3.125 mg by mouth 2 (two) times daily with meals. escitalopram 10 MG Oral Tab Take 10 mg by mouth daily.    Pantoprazole Sodium 40 MG Oral Tab EC Take 40 mg by mouth every morning before breakfast.   Albuterol Sulfate HFA (PROAIR HFA) 108 (90 Base) MCG/AC includes appendectomy; surgical stent; back surgery; hip replacement surgery; hernia surgery; prostate laser enucleation; other surgical history (04/25/2016); endoscopic ultrasound exam (9/16); egd (N/A, 9/20/2016); and Laparoscopic cholecystectomy (N/A, 9 cyanosis, clubbing or edema  NEURO: Oriented times three, cranial nerves are intact, motor and sensory are grossly intact    ASSESSMENT/ PLAN:   Diagnoses and all orders for this visit:    Weakness: home exercises, CBC, vit. D, vit. B12, TSH today.     COPD

## 2018-04-11 ENCOUNTER — PRIOR ORIGINAL RECORDS (OUTPATIENT)
Dept: OTHER | Age: 83
End: 2018-04-11

## 2018-04-11 NOTE — TELEPHONE ENCOUNTER
----- Message from Lucero Soni MD sent at 4/11/2018  9:57 AM CDT -----  Low vit. D, Rx [id number] for 4 months, weekly please. Pt has hypothyroid, ok to start Synthroid 50ug a day, morning on empty stomach, 2 months f/u vit. D and TSH before the visit.

## 2018-04-11 NOTE — TELEPHONE ENCOUNTER
Dr Bryant Cline office called to inform Dr Mignon Cheema they will contact patient to come into office. Dr Mignon Cheema was notified and was ok with them following up regarding SOB and fatigue.

## 2018-04-11 NOTE — TELEPHONE ENCOUNTER
Discussed all results and medications with Daughter Bernardino Diggs. She voiced understanding and had no further questions.

## 2018-04-26 NOTE — TELEPHONE ENCOUNTER
Alina Miramontes with [facility] asking to speak to a nurse states pt has been discharged from Occupational Therapy pt has reached potential but will still continue PT. Please call Alina Miramontes with any questions at [phone].

## 2018-04-27 NOTE — TELEPHONE ENCOUNTER
GABBYI: I spoke with Billy Loving from [facility] OT, pt has met his goals & has been discharged from OT.

## 2018-05-23 PROBLEM — E87.3 METABOLIC ALKALOSIS: Status: ACTIVE | Noted: 2018-01-01

## 2018-05-23 PROBLEM — J69.0 ASPIRATION PNEUMONITIS (HCC): Status: ACTIVE | Noted: 2018-01-01

## 2018-05-24 PROBLEM — J90 PLEURAL EFFUSION: Status: ACTIVE | Noted: 2018-01-01

## 2018-05-24 PROBLEM — E11.8 TYPE 2 DIABETES MELLITUS WITH COMPLICATION, WITH LONG-TERM CURRENT USE OF INSULIN (HCC): Status: ACTIVE | Noted: 2017-03-28

## 2018-05-24 PROBLEM — R19.7 DIARRHEA, UNSPECIFIED TYPE: Status: ACTIVE | Noted: 2018-01-01

## 2018-05-24 PROBLEM — Z79.4 TYPE 2 DIABETES MELLITUS WITH COMPLICATION, WITH LONG-TERM CURRENT USE OF INSULIN (HCC): Status: ACTIVE | Noted: 2017-03-28

## 2018-05-24 PROBLEM — I10 BENIGN ESSENTIAL HTN: Status: ACTIVE | Noted: 2017-02-02

## 2018-05-24 PROBLEM — R10.9 ABDOMINAL PAIN OF UNKNOWN ETIOLOGY: Status: ACTIVE | Noted: 2018-01-01

## 2018-05-24 NOTE — CONSULTS
Pulmonary Consult     Assessment / Plan:  1. Hypoxia - due to pulmonary fibrosis and possible PNA  - wean O2 as able  2. Possible PNA - may have aspirated as he has e/o aspirated barium  - joao and azithro  - follow-up cultures  - swallow eval  3.  Pulmonar uncontrolled    • Unspecified essential hypertension    • UTI (lower urinary tract infection)        Past Surgical History:  No date: APPENDECTOMY  No date: BACK SURGERY  9/20/2016: EGD N/A      Comment: Procedure: ESOPHAGOGASTRODUODENOSCOPY (EGD); 40 MG Oral Tab EC Take 40 mg by mouth every morning before breakfast. Disp:  Rfl:  5/23/2018 at 0900   Albuterol Sulfate HFA (PROAIR HFA) 108 (90 Base) MCG/ACT Inhalation Aero Soln USE 2 INHALATIONS EVERY 4 HOURS AS NEEDED FOR WHEEZING Disp: 25.5 g Rfl: 3 Oral Tab Take 10 mg by mouth daily.  Disp:  Rfl:    Pantoprazole Sodium 40 MG Oral Tab EC Take 40 mg by mouth every morning before breakfast. Disp:  Rfl:    Albuterol Sulfate HFA (PROAIR HFA) 108 (90 Base) MCG/ACT Inhalation Aero Soln USE 2 INHALATIONS EVER °C) 96.6 °F (35.9 °C) 97.3 °F (36.3 °C)   TempSrc: Oral Oral Oral Oral   SpO2: 99% 97% 95% 99%   Weight: 202 lb 3.2 oz (91.7 kg)      Height:         General: NAD  HEENT: OP clear  Respiratory: bibasilar crackles  Cardiovascular: RRR, no MRG  Abdo: soft, N

## 2018-05-24 NOTE — PLAN OF CARE
0100: Pt received from ER AOx4, forgetful, follow commands. 3L NC sat 98%. TRES saline lock. No family present. 0200: Pt dont remember his own meds. Will follow up this morning when daughter Nate Moore come. BS-71. Pt likes maik crackers and apple juice.  Yoana Guzman

## 2018-05-24 NOTE — PAYOR COMM NOTE
--------------  ADMISSION REVIEW     Payor: Jeanette Rico  Subscriber #:  [address]  Authorization Number: BULT [id number]    Admit date: 5/24/18  Admit time: 2550       Admitting Physician: Diana Nobles MD  Attending Physician:  Taylor Thompson skin cancers- face and ears   • Coronary atherosclerosis    • Deep vein thrombosis (HCC)     25-30 yrs ago after air flight   • Depression    • Esophageal reflux    • High cholesterol    • Kidney stone    • Other and unspecified hyperlipidemia    • Pneu person, place, and time. No cranial nerve deficit. He exhibits normal muscle tone. Patient has good  strength and no focal weakness. Skin: Skin is warm and dry. Psychiatric: He has a normal mood and affect.       Glucose 109 (*)     BUN 25 (*) diagnosis)  Pulmonary fibrosis (HCC)  Pleural effusion  Diarrhea, unspecified type  Abdominal pain of unknown etiology    [facility] HOSPITALIST  History and Physical     Chief Complaint: gen weakness, diarrhea, abd pain    History of Present Illness: Aliza Sandoval 2054 05/23/18 2057   WBC  7.7   --    HGB  13.7   --    MCV  101.6*   --    PLT  156.0   --    INR   --   1.04     GLU  109*   BUN  25*   CREATSERUM  1.03   GFRAA  73   GFRNAA  63   CA  9.4   ALB  2.8*   NA  147*   K  4.2   CL  103   CO2  39.0*   ALKPHO Aspart Pen (NOVOLOG) 100 UNIT/ML flexpen 1-5 Units     Date Action Dose Route User    5/24/2018 1211 Given 3 Units Subcutaneous (Left Upper Arm) Chiara Sneed, RN      Levothyroxine Sodium (SYNTHROID) tab 50 mcg     Date Action Dose Route User    5/

## 2018-05-24 NOTE — PROGRESS NOTES
[facility] HOSPITALIST  Progress Note     Mike Telles Patient Status:  Inpatient    10/6/1925 MRN [id number]   [facility] 5NW-A Attending Shanell Sandoval MD   Hosp Day # 0 PCP Consuelo Crump MD     Chief Complaint: fatigue, weakness     S: Sodium  40 mg Oral QAM AC   • atorvastatin  10 mg Oral Nightly   • azithromycin  500 mg Intravenous Q24H   • Heparin Sodium (Porcine)  5,000 Units Subcutaneous Q8H [address]   • Insulin Aspart Pen  1-5 Units Subcutaneous TID CC and HS   • meropenem  500 mg Intra

## 2018-05-24 NOTE — ED INITIAL ASSESSMENT (HPI)
PT WAS DC'D FROM [facility] ON THE 20TH. HE WAS ADMITTED FOR PNEUMONIA AND SEPSIS. PT HERE FOR WEAKNESS IN GENERAL. L HAND EDEMA THAT IS RESOLVING.

## 2018-05-24 NOTE — H&P
[facility] HOSPITALIST  History and Physical     Melissa Garcia Patient Status:  Emergency    10/6/1925 MRN [id number]   Location [address] [facility] Attending Pasha Baker MD   Hosp Day # 0 PCP Selwyn Mayes MD     Chief Complaint: g APPENDECTOMY  No date: BACK SURGERY  9/20/2016: EGD N/A      Comment: Procedure: ESOPHAGOGASTRODUODENOSCOPY (EGD);                  Surgeon: Jose Ritchie MD;  Location: [facility]                ENDOSCOPY  9/16: ENDOSCOPIC ULTRASOUND EXAM      Comment: iris mg by mouth daily.  Disp:  Rfl:    Pantoprazole Sodium 40 MG Oral Tab EC Take 40 mg by mouth every morning before breakfast. Disp:  Rfl:    Albuterol Sulfate HFA (PROAIR HFA) 108 (90 Base) MCG/ACT Inhalation Aero Soln USE 2 INHALATIONS EVERY 4 HOURS AS NEED neurological deficits. CNII-XII grossly intact. Musculoskeletal: Moves all extremities. Extremities: No edema or cyanosis. Integument: No rashes or lesions. Psychiatric: Appropriate mood and affect.       Diagnostic Data:      Labs:  Recent Labs   Lab

## 2018-05-24 NOTE — OCCUPATIONAL THERAPY NOTE
OCCUPATIONAL THERAPY EVALUATION - INPATIENT     Room Number: 818/047-V  Evaluation Date: 5/24/2018  Type of Evaluation: Initial  Presenting Problem: PNA, sepsis    Physician Order: IP Consult to Occupational Therapy  Reason for Therapy: ADL/IADL Dysfunctio EGD N/A      Comment: Procedure: ESOPHAGOGASTRODUODENOSCOPY (EGD);                  Surgeon: Rey Park MD;  Location: [facility]                ENDOSCOPY  9/16: ENDOSCOPIC ULTRASOUND EXAM      Comment: pancreas cysts; cystic duct stones  No date: HERNIA S Behavioral/Emotional/Social: pleasant and cooperative    RANGE OF MOTION AND STRENGTH ASSESSMENT  Upper extremity ROM is within functional limits except for the following:  Right Shoulder flexion R shoulder 1/4 range    Upper extremity strength is with worker    ASSESSMENT     Patient is a 80year old male admitted on 5/23/2018 for PNA, sepsis. Complete medical history and occupational profile noted above. Functional outcome measures completed include AMPAC, ROM, MMT.  The AM-[facility] ' '6-Clicks' Inpatient Da supervision    UE Exercise Program Goal  Patient will be supervision with bilateral AROM HEP (home exercise program).

## 2018-05-24 NOTE — ED PROVIDER NOTES
Patient Seen in: [facility] Emergency Department    History   Patient presents with:  Fatigue (constitutional, neurologic)    Stated Complaint: fatigue, slow speech, s/p hospitalization for severe sepsis    HPI    80-year-old male presents to the [facility] flight   • Depression    • Esophageal reflux    • High cholesterol    • Kidney stone    • Muscle weakness    • Osteoarthritis    • Other and unspecified hyperlipidemia    • Pneumonia     aspiration   • Pulmonary fibrosis (HCC)    • Shortness of breath cannula    Current:BP (!) 168/88   Pulse 88   Temp 97.5 °F (36.4 °C) (Temporal)   Resp 18   Ht 188 cm (6' 2\")   Wt 93 kg   SpO2 100%   BMI 26.32 kg/m²         Physical Exam   Constitutional: He is oriented to person, place, and time.  He appears well-devel components within normal limits   TROPONIN I - Normal   PROTHROMBIN TIME (PT) - Normal   PTT, ACTIVATED - Normal   CBC WITH DIFFERENTIAL WITH PLATELET    Narrative: The following orders were created for panel order CBC WITH DIFFERENTIAL WITH PLATELET. done.  It appears that he has an aspiration pneumonia. He has a chronic right pleural effusion. There was no evidence of a urinary tract infection.   He was unable to provide us with any stool but since he was having diarrhea and has recently been on anti

## 2018-05-24 NOTE — PHYSICAL THERAPY NOTE
PHYSICAL THERAPY EVALUATION - INPATIENT     Room Number: 271/900-Q  Evaluation Date: 5/24/2018  Type of Evaluation: Initial  Physician Order: PT Eval and Treat    Presenting Problem: aspiration pneumonitis  Reason for Therapy: Mobility Dysfunction an [facility]                ENDOSCOPY  9/16: ENDOSCOPIC ULTRASOUND EXAM      Comment: pancreas cysts; cystic duct stones  No date: HERNIA SURGERY  No date: HIP REPLACEMENT SURGERY      Comment: lt hip  9/28/2016: LAPAROSCOPIC CHOLECYSTECTOMY N/A      Comment: Proced NEUROLOGICAL FINDINGS                      ACTIVITY TOLERANCE  Initially pt on 3L O2- demos dec O2 sats to 77% with activity. Increased to 6L after inability to recover over 5 mins. Improved to 98%.   Pt left on 4L with sats at 94% admitted on 5/23/2018 for aspiration pnuemonitis. Pertinent comorbidities and personal factors impacting therapy include h/o repeated hospitalizations.   In this PT evaluation, the patient presents with the following impairments decreased activity toleranc #5    Goal #6    Goal Comments: Goals established on 5/24/2018

## 2018-05-25 NOTE — CM/SW NOTE
05/24/18 2100   CM/SW Referral Data   Referral Source Physician   Reason for Referral Protocol order set   Specify order set Pneumonia   Informant [facility]   Pertinent Medical Hx   Primary Care Physician Name REID Knott    Patient Info   Patient's

## 2018-05-25 NOTE — PROGRESS NOTES
NURSING DISCHARGE NOTE    Discharged Home via Wheelchair. Accompanied by Family member  Belongings Taken by patient/family. Pt is assessed and ready for discharge. Instructions gone over with patient and daughter at bedside.  All questions answered

## 2018-05-25 NOTE — PHYSICAL THERAPY NOTE
IP PT attempted. Pt with lunch tray and politely refusing stating his daughter is on her way. Pt is preparing for d/c.

## 2018-05-25 NOTE — OCCUPATIONAL THERAPY NOTE
OCCUPATIONAL THERAPY TREATMENT NOTE - INPATIENT     Room Number: 274/077-F  Session: 1   Number of Visits to Meet Established Goals: 3    Presenting Problem: PNA, sepsis    History related to current admission: Pt admitted 5/23/2018 for Aspiration pneumoni MD;  Location: [address] [place] [place] [address]                ENDOSCOPY  9/16: ENDOSCOPIC ULTRASOUND EXAM      Comment: pancreas cysts; cystic duct stones  No date: HERNIA SURGERY  No date: HIP REPLACEMENT SURGERY      Comment: lt hip  9/28/2016: LAPAROSCOPIC CHOLECYSTECTOMY N/A patient questions and concerns addressed    ASSESSMENT   Pt is progressing towards OT goals. Pt presents with deficits in strength, balance, pacing, endurance impacting safety and independence in ADL/functional transfers.  Pt currently requires min (A) for

## 2018-05-25 NOTE — DISCHARGE SUMMARY
[facility] HOSPITALIST  DISCHARGE SUMMARY     Taco Gallardo Patient Status:  Inpatient    10/6/1925 MRN [id number]   [facility] 5NW-A Attending Darin Quintanilla MD   Hosp Day # 1 PCP David Sutton MD     Date of Admission: 2018  Date of abd pain. Pt was recently discharged from [facility] for PNA/Sepsis and was sent home with cefuroxime. He has noticed worsening abd pain and diarrhea since then. He denies any F/C. He denies any blood in his stool. He denies any CP or SOB or cough.  He 5/30/2018  Quantity:  45 capsule  Refills:  0        CONTINUE taking these medications      Instructions Prescription details   Albuterol Sulfate  (90 Base) MCG/ACT Aers  Commonly known as:  PROAIR HFA      USE 2 INHALATIONS EVERY 4 HOURS AS NEEDED morning before breakfast.   Refills:  0     simvastatin 20 MG Tabs  Commonly known as:  ZOCOR      Take 1 tablet (20 mg total) by mouth nightly. Quantity:  30 tablet  Refills:  6     SUPER B COMPLEX OR      Take 1 tablet by mouth daily.    Refills:  0

## 2018-05-25 NOTE — PROGRESS NOTES
[facility] HOSPITALIST  Progress Note     Melissa Garcia Patient Status:  Inpatient    10/6/1925 MRN [id number]   [facility] 5NW-A Attending Stephania Isaac MD   Hosp Day # 1 PCP Selwyn Mayes MD     Chief Complaint: fatigue, weakness     S: 66.8 mL/min (based on SCr of 0.82 mg/dL). Recent Labs   Lab  05/23/18 2057   PTP  14.0   INR  1.04       Recent Labs   Lab  05/23/18 2054   TROP  <0.046            Imaging: Imaging data reviewed in Epic.     Medications:   • AmLODIPine Besylate  2.5 status. High risk for readmission and likely would benefit from palliative care following as OP. Heather PANDEY  9:21 AM     Agree with above A/P by Heather Samuel. Patient feeling well, without complaints.  Just wants to talk about his Sherren Laser he

## 2018-05-25 NOTE — PROGRESS NOTES
[address] Patient Status:  Inpatient    10/6/1925 MRN [id number]   [facility] 5NW-A Attending Shanell Sandoval MD   Hosp Day # 1 PCP Consuelo Crump MD     Pulm / Critical Care Progress Note     S: pt states he is fe BS.   Extremity: no edema         Recent Labs   Lab  05/23/18 2054 05/24/18 0543   WBC  7.7  7.1   HGB  13.7  12.3*   HCT  45.1  41.7   PLT  156.0  126.0*     Recent Labs   Lab  05/23/18 2057   INR  1.04         Recent Labs   Lab  05/23/18 2054  0

## 2018-05-25 NOTE — SLP NOTE
ADULT SWALLOWING EVALUATION    ASSESSMENT    ASSESSMENT/OVERALL IMPRESSION:  Orders were received for a bedside swallowing evaluation as concern for Possible PNA and history of aspiration on VFSS in the past. Patient seen for a VFSS on 3/30/17.  Patient sta List  Principal Problem:    Aspiration pneumonitis ([facility] [address]  Active Problems:    Pulmonary fibrosis (HCC)    Metabolic alkalosis    Pleural effusion    Diarrhea, unspecified type    Abdominal pain of unknown etiology      Past Medical History  Past Medical Hi effective in reducing penetration. Recommend nectar thick liquids and regular diet at this time. Further recommend initiation of pharyngeal strengthening exercises to target deficits identified to improve swallow ability.      Imaging Results:   CXR:  CXR o contact [facility]

## 2018-05-28 PROBLEM — I48.91 ATRIAL FIBRILLATION WITH RAPID VENTRICULAR RESPONSE (HCC): Status: ACTIVE | Noted: 2018-01-01

## 2018-05-28 PROBLEM — R07.9 CHEST PAIN: Status: ACTIVE | Noted: 2018-01-01

## 2018-05-28 PROBLEM — I25.10 CAD (CORONARY ARTERY DISEASE): Status: ACTIVE | Noted: 2018-01-01

## 2018-05-28 NOTE — H&P
[facility] HOSPITALIST  History and Physical     Gracia Cordero Patient Status:  Observation    10/6/1925 MRN [id number]   [facility] 8NE-A Attending Nataly Archer MD   Hosp Day # 0 PCP Essence Rasheed MD     Chief Complaint: a. fib    Hist Stroke [facility])     TIA 1970s    • Type II or unspecified type diabetes mellitus without mention of complication, not stated as uncontrolled    • Unspecified essential hypertension    • UTI (lower urinary tract infection)         Past Surgical History: Past S daily. Disp: 45 capsule Rfl: 0   BD PEN NEEDLE MINI U/F 31G X 5 MM Does not apply Misc USE 4 TIMES DAILY Disp: 150 each Rfl: 3   Insulin Lispro Prot & Lispro (HUMALOG MIX 50/50 KWIKPEN) (50-50) 100 UNIT/ML SC SUPN At meals: if BG is <100 hold insulin 101-1 20   Ht 188 cm (6' 2\")   Wt 205 lb (93 kg)   SpO2 99%   BMI 26.32 kg/m²   General: No acute distress. Alert and oriented x 3. HEENT: Normocephalic atraumatic. Moist mucous membranes. EOM-I. PERRLA. Anicteric. Neck: No lymphadenopathy. No JVD.  No carotid respiratory failure  1. Continue oxygen as needed  2. Wean oxygen as tolerated  3. Pulmonology evaluation  4. Likely combo of recurrent aspiration given his noncompliance with modified diet as well as pulm edema from rapid a. fib  2.  Acute pulmonary edema

## 2018-05-28 NOTE — CONSULTS
[facility]  Cardiology Consultation    Katelyn Sanchez Patient Status:  Observation    10/6/1925 MRN [id number]   [facility] 8NE-A Attending Lloyd Juarez MD   Hosp Day # 0 PCP Milton Hughes MD     Reason for Consultation:  afib wit (EGD);                  Surgeon: Haven Mullins MD;  Location: [facility]                ENDOSCOPY  9/16: ENDOSCOPIC ULTRASOUND EXAM      Comment: pancreas cysts; cystic duct stones  No date: HERNIA SURGERY  No date: HIP REPLACEMENT SURGERY      Comment: lt hi otherwise mentioned in above HPI.    BP (!) 145/91 (BP Location: Left arm)   Pulse 98   Temp 97.9 °F (36.6 °C) (Oral)   Resp (!) 27   Ht 6' 2\" (1.88 m)   Wt 205 lb (93 kg)   SpO2 95%   BMI 26.32 kg/m²   Temp (24hrs), Av.8 °F (36.6 °C), Min:97.6 °F (36 ([address]    Chest pain: trop nml with cp lasting over 12 hours continuously    CAD (coronary artery disease) sp pci    Probable component of acute diastolic hf given high hr with afib          Recommendations:  1. Rate control,  2. Iv diuretics  3.  Iv heparin

## 2018-05-28 NOTE — RESPIRATORY THERAPY NOTE
PT DOES NOT WEAR CPAP AT HOME AND IS NOT INTERESTED IN TRYING ONE WHILE ADMITTED.  WILL MONITOR SPO2 AND CONT HIS BASELINE 4L NC

## 2018-05-28 NOTE — ED INITIAL ASSESSMENT (HPI)
Pt here with daughter saying pt has NIR X2 days. No fever. Uses oxygen at home. Seen here on 05/24/18 and still taking antibiotics.

## 2018-05-28 NOTE — CONSULTS
[facility] PULMONARY/CRITICAL CARE CONSULTATION    HPI: Pt is a 81 y/o WM with hx of O2 dependent pulm fibrosis and CAD that presented to the hospital with complaint of having difficulty with shortness of breath.   Is a history of having pulmonary fibrosis and had History:  No date: APPENDECTOMY  No date: BACK SURGERY  No date: CATARACT  9/20/2016: EGD N/A      Comment: Procedure: ESOPHAGOGASTRODUODENOSCOPY (EGD);                  Surgeon: Otto Blake MD;  Location: [address] [place] [address]                ENDOSCOPY  9/16: ENDOSCOP Yes   Sig: Take 40 mg by mouth every morning before breakfast.   Polyethylene Glycol 3350 (MIRALAX) Oral Powd Pack Past Week at Unknown time  Yes Yes   Sig: Take 17 g by mouth daily.    Wheat Dextrin (BENEFIBER OR) Past Week at Unknown time  Yes Yes   Sig: UTI/pneumonia      Social History  Social History   Marital status:    Spouse name: N/A    Years of education: N/A  Number of children: N/A     Occupational History  None on file     Social History Main Topics   Smoking status: Never Smoker    Smokel 156.0  126.0*  158.0     Recent Labs   Lab  05/23/18 2054 05/24/18   0543  05/25/18   0542  05/28/18   1043   GLU  109*  149*   --   208*   BUN  25*  20   --   13   CREATSERUM  1.03  0.82   --   1.16   GFRAA  73  89   --   63   GFRNAA  63  77   --   54*

## 2018-05-28 NOTE — ED PROVIDER NOTES
Patient Seen in: [facility] Emergency Department    History   Patient presents with:  Dyspnea NIR SOB (respiratory)    Stated Complaint: NIR    HPI    72-year-old male comes the hospital to complaint of having difficulty with shortness of breath.   I date: BACK SURGERY  9/20/2016: EGD N/A      Comment: Procedure: ESOPHAGOGASTRODUODENOSCOPY (EGD);                  Surgeon: Julio C Marmolejo MD;  Location: [address] [place] [address]                ENDOSCOPY  9/16: ENDOSCOPIC ULTRASOUND EXAM      Comment: pancreas cysts; cystic Reviewed   COMP METABOLIC PANEL (14) - Abnormal; Notable for the following:        Result Value    Glucose 208 (*)     GFR, Non- 54 (*)     Albumin 2.8 (*)     Chloride 100 (*)     CO2 35.0 (*)     All other components within normal limits transcribed by Technologist)  Patient states that he is extremely tired. FINDINGS:  LIVER:  No enlargement, atrophy, abnormal density, or significant focal lesion. BILIARY:  Status post cholecystectomy. No biliary tree dilation.  PANCREAS:  Atrophy wit 5/28/2018  PROCEDURE:  XR CHEST AP PORTABLE  (CPT=71045)  TECHNIQUE:  AP chest radiograph was obtained. COMPARISON:  [facility] XR CHEST AP PORTABLE  (CPT=71045), 5/23/2018, 22:52.   INDICATIONS:  NIR  PATIENT STATED HISTORY: (As transcribed by Technologist) opacity. Marked degenerative changes of the spine. CONCLUSION:  Stable findings. Details above.      Dictated by: Ross Nolasco MD on 5/23/2018 at 23:08     Approved by: Ross Nolasco MD            Radiology Result Scan    Result Date: 5/25 patient arrived with a condition with significant morbidity and mortality associated.  The services I provided  were to promote improvement and reduce mortality specifically involving complex record review, complex medical decisions and interventions, and c

## 2018-05-29 NOTE — OCCUPATIONAL THERAPY NOTE
OCCUPATIONAL THERAPY EVALUATION - INPATIENT     Room Number: 8498/9802-P  Evaluation Date: 5/29/2018  Type of Evaluation: Initial  Presenting Problem: A FIb w. Ventricular response    Physician Order: IP Consult to Occupational Therapy  Reason for Therapy: 30-year-old male comes the hospital to complaint of having difficulty with shortness of breath. Is a history of having pulmonary fibrosis and had recently been hospitalized and was sent home on clindamycin. Is unclear if he had a pneumonia.   He states he aspiration   • Pneumonia due to organism    • Problems with swallowing    • Pulmonary fibrosis (HCC)     O2 4L at rest and 6L with activity   • Shortness of breath     uses it about 90% of the time - @ 2.5-3L 3.5 with exertion   • Sleep apnea    • Stroke \"I will try to do what you want me to do\"    Patient self-stated goal is to go hoem     OBJECTIVE  Precautions: None  Fall Risk: High fall risk    WEIGHT BEARING RESTRICTION  Weight Bearing Restriction: None                PAIN ASSESSMENT  Rating: Unable AM-PAC Score:  Score: 12  Approx Degree of Impairment: 66.57%  Standardized Score (AM-PAC Scale): 30.6  CMS Modifier (G-Code): CL    FUNCTIONAL TRANSFER ASSESSMENT  Supine to Sit : Dependent assistance  Sit to Stand: Dependent assistance (MAx A x2)    Skil Patient is a 80year old male admitted on 5/28/2018 for A FIb w. Ventricular response. Complete medical history and occupational profile noted above. Functional outcome measures completed include AMPAC.   The AM-CHRISTIAN ' '6-Clicks' Inpatient Daily Activity Tatiana Din OT Treatment Plan: Balance activities; Energy conservation/work simplification techniques; Visual perceptual training;Functional transfer training;UE strengthening/ROM; Endurance training;Patient/Family training;Patient/Family education;Cognitive reorientatio

## 2018-05-29 NOTE — CM/SW NOTE
Met with patient to discuss therapy recommendations for JORDAN stay. Patient receptive to idea, wanted to know facilities and cost and to review with his daughter.   Informed him of the following facilities listed on insurance website:  [facility]  and

## 2018-05-29 NOTE — CM/SW NOTE
Met with daughter at bedside--interested in referrals being sent to [facility]--sent via [employer]; await response.   DON screen requested

## 2018-05-29 NOTE — PROGRESS NOTES
Pulmonary Progress Note      NAME: Dustin Martinez - ROOM: 9230/5313-Y - MRN: [id number] - Age: 80year old - : 10/6/1925    Assessment/Plan:  1. Dyspnea/Hypoxia -- acute on chronic with pulm edema on top of underlying pulm fibrosis.   -continue with O2 sup without mass   Lungs: rales at bases   Chest wall: No tenderness or deformity. Heart: irregular   Abdomen: soft, non-tender, non-distended, positive BS.    Extremity: + edema   Skin: no new rash   Neuro: alert    Recent Labs   Lab  05/28/18   1043   RBC

## 2018-05-29 NOTE — PAYOR COMM NOTE
--------------  DISCHARGE REVIEW    Payor: Sunil Perry  Subscriber #:  [address]  Authorization Number: Sarba Timur [id number]    Admit date: 5/24/18  Admit time:  0033  Discharge Date: 5/25/2018  4:20 PM     Admitting Physician: Cyntha Leventhal, MD  At

## 2018-05-29 NOTE — SLP NOTE
ADULT SWALLOWING EVALUATION    ASSESSMENT    ASSESSMENT/OVERALL IMPRESSION:  Pt seen this PM for bedside swallow evaluation. Pt admitted to hospital due to SOB. Pt diagnosed with hypoxia secondary to pulmonary fibrosis and pulmonary edema.  Pt recent discha Altered diet consistency    Problem List  Principal Problem:    Atrial fibrillation with rapid ventricular response (HCC)  Active Problems:    Oxygen dependent    Benign essential HTN    Pulmonary fibrosis (HCC)    Chest pain    CAD (coronary artery diseas OBJECTIVE   ORAL MOTOR EXAMINATION  Dentition: Upper dentures; Lower dentures  Symmetry: Within Functional Limits  Strength:  Within Functional Limits  Tone: Within Functional Limits  Range of Motion: Within Functional Limits  Rate of Motion: Within

## 2018-05-29 NOTE — PROGRESS NOTES
Heart Failure Coordinator Progress Note    Patient was evaluated by the Heart Failure Coordinator for understanding, verbalization, demonstration, and recall of education related to heart failure, overall adherence to the behaviors Peg Messenger ___ yes  _x__ no     Patient has sufficient funds to purchase medication                      __x_ yes  ___ no      Patient is able to verbalize signs/symptoms fluid overload/impending HF exacerbation and who to contact with problems

## 2018-05-29 NOTE — PHYSICAL THERAPY NOTE
PHYSICAL THERAPY EVALUATION - INPATIENT     Room Number: 8085/3066-S  Evaluation Date: 5/29/2018  Type of Evaluation: Initial  Physician Order: PT Eval and Treat    Presenting Problem: ALMARAZ, atr afib  Reason for Therapy: Mobility Dysfunction and Disch • UTI (lower urinary tract infection)        Past Surgical History  Past Surgical History:  No date: APPENDECTOMY  No date: BACK SURGERY  No date: CATARACT  9/20/2016: EGD N/A      Comment: Procedure: ESOPHAGOGASTRODUODENOSCOPY (EGD);                  Mayte Padilla ASSESSMENT  Ratin  Location: denies at this time  Management Techniques:  Activity promotion    COGNITION  · Overall Cognitive Status:  WFL - within functional limits  · Following Commands:  follows one step commands consistently    RANGE OF MOTION AND Gait Assistance: Not tested  Distance (ft): 0           Comment : pt not appropr for amb at this time, rec sit to stand lift    Skilled Therapy Provided: pt recd sitting up in bs chair, daughter present. Pt ok to see per Tex Fagan.   Pt educated in role impairment may benefit from JORDAN. Based on this evaluation, patient's clinical presentation is evolving and overall the evaluation complexity is considered moderate.   These impairments and comorbidities manifest themselves as functional limitations in inde

## 2018-05-29 NOTE — CM/SW NOTE
As follow up to request to check coverage of xarelto/eliquis, call placed to patient's [facility] [phone]--pharmacist to run and call back with cost/need for prior authorization  Per pharmacist at [facility], both meds $43.50 per month--await medication of choice

## 2018-05-29 NOTE — CM/SW NOTE
Patient discharged on 05/25/2018 as previously planned.        05/29/18 0900   Discharge disposition   Expected discharge disposition Home-Health   Name of [facility] services after discharge Skilled home care   [place]

## 2018-05-29 NOTE — PROGRESS NOTES
[facility]  Cardiology Progress Note    Subjective:  Still feels \"lousy\". Biggest complaint is constipation. Still not breathing at baseline. No chest pain. Had bm today so is happy.  Was seeing someone outside his window and felt the wall move tw disease) w/ hx PCI    Probable component of acute diastolic hf given high hr with afib - echo pending    AI: mild    Plan:    - Await echo  - Try to wean diltiazem infusion, start oral diltiazem 60mg PO every 6 hours.   No beta blocker for now w/ oxygen dep

## 2018-05-30 NOTE — PROGRESS NOTES
[facility]  Cardiology Progress Note    Subjective:  No chest pain or shortness of breath. Still having intermittent confusion, especially at night. Continues to maintain SR.      Objective:  /52 (BP Location: Left arm)   Pulse 96   Temp 98.2 ° ~40-45mmHg)  ·   Elevated creatinine - diuretic effect, will stop   ·   DNR    Plan:    - Reviewed cost of DOAC options w/ daughter and patient. Xarelto and Eliquis both ~$43/mo. They are interested in Xarelto (once a day dosing).   Will initiate Xarelto

## 2018-05-30 NOTE — CM/SW NOTE
Met with patient and daughter at bedside--they have selected [facility] care of [facility]. Message left with  patricia in admissions for facility to officially obtain insurance authorization and to request private room.   Provided daughter with xarelto 30 day free

## 2018-05-30 NOTE — SLP NOTE
SPEECH DAILY NOTE - INPATIENT    ASSESSMENT & PLAN   ASSESSMENT  Pt seen at bedside this AM for speech therapy services. Pt cooperative, pleasant, and alert.  Per RN, pt demonstrating increased difficulty with scrambled eggs this AM. Trial nectar thick liqu UP  Follow Up Needed: Yes  SLP Follow-up Date: 05/31/18  Number of Visits to Meet Established Goals: 2    Session: 1/2    If you have any questions, please contact Antelmo Resendez M.S. [address] [facility] [place]  Pager [phone]

## 2018-05-30 NOTE — PROGRESS NOTES
05/30/18 1544   Clinical Encounter Type   Visited With Health care provider   Per RN's request,  uploaded a new completed POLST form into the EMR with medical intervention.

## 2018-05-30 NOTE — PROGRESS NOTES
[facility] HOSPITALIST  Progress Note     Richard Nieves Patient Status:  Inpatient    10/6/1925 MRN [id number]   [facility] 8NE-A Attending Marcus Suarez MD   Hosp Day # 1 PCP Michael Briones MD     Chief Complaint: a. fib    S: Patient feel --    --   19   --    BILT  0.2   --    --   0.5   --    TP  6.7   --    --   7.1   --        Recent Labs   Lab  05/23/18 2057   PTP  14.0   INR  1.04       Recent Labs   Lab  05/23/18 2054 05/28/18   1043   TROP  <0.046  <0.046            Imaging: Im Recommendations - Solids: Regular  Diet Recommendations - Liquid: Nectar thick    Quality:  · DVT Prophylaxis: heparin drip  · CODE status: DNR - reviewed with patient/dtr and will call son POA today    Estimated date of discharge: later today/tomorrow?

## 2018-05-30 NOTE — PLAN OF CARE
Pt is AOx4, forgetful and confused at times. On 5L nasal cannula. SR on tele with frequent PVCs. Up with assist of 2. Voids in the urinal.  Heparin gtt infusing. QID accucheck. Will monitor.

## 2018-05-30 NOTE — PROGRESS NOTES
Pulmonary Progress Note        NAME: Dustin Martinez - ROOM: [facility]/7478- - MRN: [id number] - Age: 80year old - : 10/6/1925        SUBJECTIVE: No events overnight, feels that breathing is at baseline, notes that he feels more comfortable on 4L than 3L    O Recent Labs   05/28/18  1043   ALT 19   AST 24   ALB 2.8*       Invalid input(s): ARTERIALPH, ARTERIALPO2, ARTERIALPCO2, ARTERIALHCO3    No results for input(s): BNP in the last 72 hours.     Invalid input(s): TROPI      INR   Date/Time Value Ref Ra

## 2018-05-30 NOTE — PAYOR COMM NOTE
--------------  ADMISSION REVIEW     Payor: Cristal Barriga  Subscriber #:  [address]  Authorization Number: Mirtha Hansen [id number]    Admit date: 5/29/18  Admit time: 7103       Admitting Physician: Leilani Eaton MD  Attending Physician:  Leilani Eaton, panel order CBC WITH DIFFERENTIAL WITH PLATELET.   Procedure                               Abnormality         Status                     ---------                               -----------         ------                     CBC W/ DIFFERENTIAL[[id number]] decompensated  5. Dysphagia  1. Likely recently treated for aspiration PNA  2. currently no signs of active infection  3. Non compliant with modified diet at home (supposed to be on nectar thickened liquids)  6. DM2  1. Insulin protocol  7. HTN  1.  Hold no Lower Abdomen) Emy Blancas RN    5/29/2018 1238 Given 6 Units Subcutaneous (Right Upper Arm) Liana Martinez, KACIE      Levothyroxine Sodium (SYNTHROID) tab 50 mcg     Date Action Dose Route User    5/30/2018 0511 Given 50 mcg Oral Jose Miguel Serra

## 2018-05-31 NOTE — CM/SW NOTE
Call with message left with cyndi  [phone] regarding insurance authorization status--await call back

## 2018-05-31 NOTE — PROGRESS NOTES
[address] Patient Status:  Inpatient    10/6/1925 MRN [id number]   [facility] 8NE-A Attending Laverne De La Paz MD   Hosp Day # 2 PCP George Hyde MD     Pulm / Critical Care Progress Note     S: periodic confusion positive BS. Extremity: trace LE edema       Recent Labs   Lab  05/28/18   1043   WBC  9.8   HGB  13.2   HCT  43.6   PLT  158.0     No results for input(s): INR in the last 168 hours.       Recent Labs   Lab  05/28/18   1043  05/30/18   0439  05/31/18   0

## 2018-05-31 NOTE — PHYSICAL THERAPY NOTE
Attempted to see Pt this AM - RN Liana aware of attempt. Requested RN page therapist when Pt is appropriate for session. Will f/u later today if time permits, after all other patients are attempted per tentative schedule.

## 2018-05-31 NOTE — PROGRESS NOTES
[facility]  Cardiology Progress Note    Subjective:  Patient developed SOB this AM requiring bipap support. HR unchanged. No chest pain.     Objective:  /67 (BP Location: Right arm)   Pulse 78   Temp 97.8 °F (36.6 °C) (Oral)   Resp 20   Ht 6' 2\" normal.  4. Right ventricle: The cavity size was mildly increased. Wall thickness     could not be accurately determined.  Systolic function was normal.  5. Pulmonary arteries: Systolic pressure was mildly to moderately increased,     in the range of 40mm H

## 2018-05-31 NOTE — PLAN OF CARE
Pt is AOx2-3 with periods of confusion and forgetfulness. On 4L. SR with frequent PVCs and PACs. Up with assist and a walker. QID accucheck. Will monitor.

## 2018-05-31 NOTE — PROGRESS NOTES
[facility]  Progress Note     Shaquille Talley Patient Status:  Inpatient    10/6/1925 MRN [id number]   [facility] 8NE-A Attending Sharla Remy MD   Hosp Day # 2 PCP Arielle Hirsch MD     Chief Complaint: a. fib    S: Patient Yelena Hinton escitalopram  10 mg Oral Daily   • Pantoprazole Sodium  40 mg Oral QAM AC   • PEG 3350  17 g Oral Daily   • Levothyroxine Sodium  50 mcg Oral Before breakfast   • Insulin Aspart Pen  1-10 Units Subcutaneous TID AC and HS       ASSESSMENT / PLAN:     1.  Acu pressors if needed. Leonides PANDEY  8:56 AM     Addendum:    Agree with above note. Pt. Seen and examined.     Gen: NAD  CVS: s1s2  Resp: CTA  Abd: soft    -PCO2 increased  -stat Bipap  -DC melatonin/sedatives    Bob Menard MD

## 2018-05-31 NOTE — PLAN OF CARE
Patient more lethargic this morning, hallucinations overnight and yesterday afternoon into evening. Patient not wanting to answer orientation questions this morning.  CO2 96 on ABG, patient placed on BiPap and plans to recheck ABG in 2 hours    1500- no uri

## 2018-05-31 NOTE — PLAN OF CARE
Repeat ABG showing persistently elevated CO2, no change despite bipap for past few hours. Pulm rec to changed to AVAPSand Repeat ABG in 2 hrs. I updated RT/family/RN. Awaiting patient's son Selma Dowd arrival.  He is POA per dtr.       Blanca PANDEY  12:3

## 2018-05-31 NOTE — PAYOR COMM NOTE
--------------  CONTINUED STAY REVIEW    Payor: Trangmiguel Vargas  Subscriber #:  [address]  Authorization Number: Denise Richards [id number]    Admit date: 5/29/18  Admit time: 0120    Admitting Physician: Kalina Ramirez MD  Attending Physician:  Kalina Ramirez, abg shows significant co2 retention;     7.29/96/115/46     hypercapneic resp failure: Will place on bipap and repeat an abg in 1-2 hours. Pt is DNR/DNI. D/w Pt's daughter over the phone. Brought up the concept of palliative care / hospice.   Should b

## 2018-06-01 NOTE — PLAN OF CARE
Patient aox3, vss, AVAPS removed this morning after ABGs  (CO2 71) and placed on oxygen 4.5 L NC satting 98%. Dr Marilin Aguirre junior stated that patient can have coffee and am meds and wait to eat a meal after ABGs at noon. ABG at noon is 82.   RN paged Dr Pollard

## 2018-06-01 NOTE — CM/SW NOTE
Spoke with patricia at [facility] care rehab--still pending insurance authorization--updated patient's use of AVAPS--facility could still accept with AVAPS--requires time to rent machine from [employer] prior to acceptan ce--to update supriya turner

## 2018-06-01 NOTE — CM/SW NOTE
Received call from patricia at [facility] care in [place]--insurance has finally provided authorization--updated her pt with RACHEL--facility able to provide but requires notification  in advance of transfer--patricia able to be contacted by cell [phone]

## 2018-06-01 NOTE — PROGRESS NOTES
Critical Care Progress Note        NAME: Zunilda Villatoro - ROOM: 6431/9361-L - MRN: [id number] - Age: 80year old - : 10/6/1925  Date of Admission: 2018 10:25 AM  Admission Diagnosis: Atrial fibrillation with rapid ventricular response ([facility] [address] [facility] [I48.91] bases  Heart: S1, S2 normal, no murmur, click, rub or gallop, regular rate and rhythm  Abdomen: soft, non-tender; bowel sounds normal; no masses,  no organomegaly  Extremities: extremities normal, atraumatic, no cyanosis or edema        Recent Labs   05/31 [facility] Pulmonary and Critical Care

## 2018-06-01 NOTE — PROGRESS NOTES
[facility]  Cardiology Progress Note    Subjective:  No chest pain. No sob with bipap on.    Objective:  /53 (BP Location: Right arm)   Pulse 89   Temp 97.8 °F (36.6 °C) (Oral)   Resp 18   Ht 6' 2\" (1.88 m)   Wt 196 lb 6.9 oz (89.1 kg)   SpO2 10 recurrent CP. Not angina clinically. · CAD: hx PCI  · HLD: on statin  · Mild Aortic insufficiency  · Mild PAH (PAS 40-45mmHg)  · Elevated creatinine - 1.28  · DNR     Plan:  · Evaluate response to furosemide this AM and continue further diuresis.    · Con

## 2018-06-01 NOTE — PROGRESS NOTES
[facility]  Progress Note     Ranjit Sanchez Patient Status:  Inpatient    10/6/1925 MRN [id number]   [facility] 8NE-A Attending Bobby Kirk MD   Hosp Day # 3 PCP Yessica Rose MD     Chief Complaint: a. fib    S: Patient much Epic.    Medications:   • Insulin Aspart Pen  1-68 Units Subcutaneous TID CC   • DilTIAZem HCl ER Coated Beads  240 mg Oral Daily   • rivaroxaban  15 mg Oral Daily with food   • aspirin EC  81 mg Oral Daily   • atorvastatin  10 mg Oral Nightly   • escitalo awake.  PT re-eval.  f/u on pulm recs for management at rehab. Shahzad PANDEY  8:20 AM     Addendum:    Agree with above note. Pt. Seen and examined.     Gen: NAD  CVS: s1s2  Resp: course  Abd: soft    -likely re-current aspiration  -bipap/AVAPs  -

## 2018-06-01 NOTE — SLP NOTE
SPEECH DAILY NOTE - INPATIENT    ASSESSMENT & PLAN   ASSESSMENT  Pt contacted at bedside to assess tolerance of recommended diet.  Per RN, pt NPO since yesterday morning due to O2 desaturating easily, however pt's respiratory status is improved today and MD New goal   Goal #3 The patient/family/caregiver will demonstrate understanding and implementation of aspiration precautions and swallow strategies independently over 1-2 session(s).   In Progress     FOLLOW UP  Follow Up Needed: Yes  SLP Follow-up Date: 06/

## 2018-06-01 NOTE — PHYSICAL THERAPY NOTE
PHYSICAL THERAPY TREATMENT NOTE - INPATIENT    Room Number: 7050/7814-C     Session: 1   Number of Visits to Meet Established Goals: 5    Presenting Problem: ALMARAZ, atr afib    Problem List  Principal Problem:    Atrial fibrillation with rapid ventricular r Comment: pancreas cysts; cystic duct stones  No date: HERNIA SURGERY  No date: HIP REPLACEMENT SURGERY      Comment: lt hip  9/28/2016: LAPAROSCOPIC CHOLECYSTECTOMY N/A      Comment: Procedure: LAPAROSCOPIC CHOLECYSTECTOMY;                 Surgeon: Alvaro Josue Assessment   Gait Assistance: Not tested  Distance (ft): 0           Comment : Pt cleared for seated therex only per RN/MD    Skilled Therapy Provided:     Pt presented seated in BS recliner upon PT arrival. Pt willing to participate in session, working to training;Strengthening;Transfer training  Rehab Potential : Fair  Frequency (Obs): 5x/week    CURRENT GOALS     Goal #1 Patient is able to demonstrate supine - sit EOB @ level: minimum assistance      Goal #2 Patient is able to demonstrate transfers Sit to

## 2018-06-01 NOTE — PROGRESS NOTES
Heart Failure Coordinator Progress Note    Unable to revisit with pt - pt was placed on bipap on AVAP settings. Pt's son at the bedside. According to pt's bedside RN, pt's ABG's are not good, plans are for JORDAN placement when stable.  Wi

## 2018-06-02 NOTE — PROGRESS NOTES
[facility]  Progress Note     Jose Elias Lo Patient Status:  Inpatient    10/6/1925 MRN [id number]   [facility] 8NE-A Attending Romy Villalobos MD   Hosp Day # 4 PCP Domo Hoffmann MD     Chief Complaint: dyspnea    S: Patient rem • DilTIAZem HCl ER Coated Beads  240 mg Oral Daily   • rivaroxaban  15 mg Oral Daily with food   • aspirin EC  81 mg Oral Daily   • atorvastatin  10 mg Oral Nightly   • escitalopram  10 mg Oral Daily   • Pantoprazole Sodium  40 mg Oral QAM AC   •  no    Estimated date of discharge: TBD  Discharge is dependent on: clinical stability  At this point Mr. Meghana Bean is expected to be discharge to: home    Plan of care discussed with patient or family at bedside.     Quinn Serrano MD

## 2018-06-02 NOTE — PROGRESS NOTES
Pulmonary Progress Note     Assessment / Plan:  1. Acute hypercapnic resp failure -- due to pulm edema, atelectasis on top of underlying pulm fibrosis. -use AVAPS with sleep going forward. He should have this available to him at [place] [id number]  2.  Pulm edema  -diure

## 2018-06-02 NOTE — PLAN OF CARE
Patient is alert and oriented. On AVAP 40%, with high flow at 8L while eating. NSR on tele. Blood sugars were elevated twice and Dr. Katya Bains was paged, orders received. Patient up on the chair. Meds crushed with apple sauce.  POC updated with patient, all qu

## 2018-06-02 NOTE — PROGRESS NOTES
[facility]  Cardiology Progress Note    Subjective:  Tired and seems frustrated, less SOB    Objective:  /55 (BP Location: Right arm)   Pulse 79   Temp 97.9 °F (36.6 °C) (Axillary)   Resp 20   Ht 6' 2\" (1.88 m)   Wt 189 lb 9.5 oz (86 kg)   SpO2 (PAS 40-45mmHg)  · Elevated creatinine - 1.28  · DNR     Plan:  Seems to be close to euvolumic and in NSR  Switch IV lasix to po 20 bid  Creatinine improved  Continue xarelto  Pulmonary recs appreciated    Dorie Manriquez MD  [facility]

## 2018-06-02 NOTE — RESPIRATORY THERAPY NOTE
PT HAD BEEN OFF AVAPS FOR 3 HOURS (SINCE 0915). RN CALLED TO PLACE PT BACK ON AVAPS. PT LOOKED COMFORTABLE IN NO DISTRESS ON HFNC 8L. SPO2 % (RN HAD STATED SHE INCREASED DUE TO DESATURATION).  CONSULTING FAMILY MEMBERS THE PT'S MENTATION IS STABLE AN

## 2018-06-03 NOTE — PROGRESS NOTES
[facility]  Progress Note     Richard Nieves Patient Status:  Inpatient    10/6/1925 MRN [id number]   [facility] 8NE-A Attending Marcus Suarez MD   Hosp Day # 5 PCP Michael Briones MD     Chief Complaint: dyspnea    S: Patient's b 05/28/18   1043   TROP  <0.046            Imaging: Imaging data reviewed in Epic.     Medications:   • magnesium sulfate  2 g Intravenous Once   • furosemide  20 mg Oral BID (Diuretic)   • Insulin NPH (Human) (Isophane)  13 Units Subcutaneous BID   • predni care: as above. resp status improving. Dec steroids. Dec insulin.      Quality:  · DVT Prophylaxis: xarelto  · CODE status: DNR  · Dennis: no  · Central line: no    Estimated date of discharge: TBD  Discharge is dependent on: clinical stability  At this poin

## 2018-06-03 NOTE — PROGRESS NOTES
Assumed care of patient from Ovi Arevalo RN. Patient resting comfortably in chair on 5L/NC high flow; denied any complaints or needs at this time. Son at bedside as well. POC reviewed w/pt & son; all questions answered at this time.   Call light within r

## 2018-06-03 NOTE — PROGRESS NOTES
[facility] Pharmacy Note:  Renal Dose Adjustment for Metoclopramide (REGLAN)    Richard Nieves has been prescribed Metoclopramide (REGLAN) 10 mg every 8 hours as needed for nausea.     Estimated Creatinine Clearance: 26.6 mL/min (A) (based on SCr of 2.06 mg/dL (H))

## 2018-06-03 NOTE — PLAN OF CARE
Patient is alert and oriented. On AVAP 40%while sleeping, with high flow at 5L during the day. NSR on tele. Blood sugar was elevated  and Dr. Baird Cowden was paged, orders received, 21 units given. Patient up on the chair. Meds crushed with apple sauce.  POC upd

## 2018-06-03 NOTE — PROGRESS NOTES
[facility]  Cardiology Progress Note    Subjective:  Tired but alert today, no complaints    Objective:  /38 (BP Location: Left arm)   Pulse 83   Temp 97.7 °F (36.5 °C) (Axillary)   Resp 20   Ht 6' 2\" (1.88 m)   Wt 192 lb 10.9 oz (87.4 kg)   Sp pulmonary  · HTN: BP stable, no home BP meds. Mildly elevated. Given clinical presentation, continue to monitor for need of change in medical regimen  · Chest pain: presented with CP, trop negative. No recurrent CP. Not angina clinically.    · CAD: hx PCI

## 2018-06-03 NOTE — PLAN OF CARE
DNR!    Aspiration precautions maintained  High risk for skin breakdown precautions maintained  High fall risk precautions maintained  High risk for bleeding precautions maintained    Comments: Pt is A&Ox3-4 (forgetful re: situation at times and voice is h sites for bleeding and/or hematoma  - Assess quality of pulses, skin color and temperature  - Assess for signs of decreased coronary artery perfusion - ex.  Angina  - Evaluate fluid balance, assess for edema, trend weights   Outcome: Progressing    Goal: Ab slow rate  - No straws  - Encourage small bites of food and small sips of liquid  - Offer pills one at a time, crush or deliver with applesauce as needed  - Discontinue feeding and notify MD (or speech pathologist) if coughing or persistent throat clearing interventions as ordered  Outcome: Progressing    Problem: Impaired Activities of Daily Living  Goal: Achieve highest/safest level of independence in self care  Interventions:  - Assess ability and encourage patient to participate in ADLs to maximize funct assist with strengthening/mobility  - Encourage toileting schedule  Outcome: Progressing    Problem: DISCHARGE PLANNING  Goal: Discharge to home or other facility with appropriate resources  INTERVENTIONS:  - Identify barriers to discharge w/pt and caregiv

## 2018-06-03 NOTE — PROGRESS NOTES
Pulmonary Progress Note     Assessment / Plan:  1. Acute hypercapnic resp failure -- due to pulm edema, atelectasis on top of underlying pulm fibrosis. -use AVAPS with sleep going forward. He should have this available to him at [place] [address].  Currently he is inter

## 2018-06-04 NOTE — CONSULTS
[facility]    Report of Consultation    Richard Nieves Patient Status:  Inpatient    10/6/1925 MRN [id number]   [facility] 8NE-A Attending Marcus Suarez MD   [facility] Day # 6 PCP Michael Briones MD     Date of Admission:  2018  Da atherosclerosis    • Deep vein thrombosis (HCC)     25-30 yrs ago after air flight   • Depression    • Esophageal reflux    • [facility] ([facility]    • High blood pressure    • High cholesterol    • Kidney stone    • Muscle weakness    • Osteoarthritis    • RASH    Comment:Patient tolerates 81mg aspirin  Penicillins             RASH    Comment:Treated with Rocephin (ceftriaxone) for             UTI/pneumonia    Medications:    Current Facility-Administered Medications:   •  Insulin NPH (Human) (Isophane) (HUM tablet, 8 tablet, Oral, Q15 Min PRN    Review of Systems:  Pertinent items are noted in HPI.     Physical Exam:  /55 (BP Location: Left arm)   Pulse 95   Temp 98.7 °F (37.1 °C) (Oral)   Resp 20   Ht 6' 2\" (1.88 m)   Wt 192 lb 10.9 oz (87.4 kg)   SpO2 cholecystectomy. No biliary tree dilation. PANCREAS:  Atrophy without focal mass. SPLEEN:  No enlargement or significant focal lesion. KIDNEYS:  Atrophy of the left kidney, right renal cyst, no nephrolithiasis. No obstruction.   ADRENALS:  No mass or pressure less than 130/80     Oxygen dependent     Benign essential HTN     Pure hypercholesterolemia     Type 2 diabetes mellitus without complication, with long-term current use of insulin (HCC)     Mucopurulent chronic bronchitis (HCC)     Medicare jose

## 2018-06-04 NOTE — PROGRESS NOTES
Pulmonary Progress Note        NAME: Jose Brian - ROOM: 7038/8902-I - MRN: [id number] - Age: 80year old - : 10/6/1925        SUBJECTIVE: No events overnight, noting some sore throat, still dealing with cough, mostly nonproductive    OBJECTIVE:    12.7*   .2*   .0         Recent Labs   06/03/18  0819 06/04/18  0443   *  --    K 4.1  --    CL 98*  --    CO2 44.0*  --    BUN 47*  --    CA 9.2  --    MG 1.7* 2.5       No results for input(s): ALT, AST, ALB, AMYLASE, LIPASE, LDH in t

## 2018-06-04 NOTE — PROGRESS NOTES
[facility]  Cardiology Progress Note    Subjective:  No chest pain or shortness of breath. Still with some confusion.       Objective:  /55 (BP Location: Left arm)   Pulse 95   Temp 98.7 °F (37.1 °C) (Oral)   Resp 20   Ht 6' 2\" (1.88 m)   Wt 19 (PAS ~40-45mmHg)  ·   Elevated creatinine - diuretic effect, will stop   ·   DNR    Plan:    - Stop PO lasix with rising creatinine  - Anticoagulation changed to Eliquis by hospitalist - continue  - Eventual discharge plan to [facility] next 2-3 days pending clin

## 2018-06-04 NOTE — CM/SW NOTE
Spoke with patricia at [facility] in [place]--clinical updates sent via [facility]--informed that with patient's insurance, need to readdress insurance authorization.   If needed, will attempt order of AVAPS prior to transfer to [facility]--await follow up with

## 2018-06-04 NOTE — PROGRESS NOTES
[facility]  Progress Note     Katelyn Sanchez Patient Status:  Inpatient    10/6/1925 MRN [id number]   [facility] 8NE-A Attending Lloyd Juarez MD   Hosp Day # 6 PCP Kade Briones MD     Chief Complaint: dyspnea    S: Patient catherine BILT  0.5   --    --    --    --    --    TP  7.1   --    --    --    --    --     < > = values in this interval not displayed. No results for input(s): PTP, INR in the last 168 hours.     Recent Labs   Lab  05/28/18   1043   TROP  <0.046 review with MD and monitor po intake today  3. novolog - correctional 1 per 20 and nutritional 1 per 8g   10. h/o urinary retention  11. Metabolic alkalosis  12. Cerebrovascular disease with hx of CVA  13. Hypernatremia, improving  14.  Leukocytosis 2/2 ricardo

## 2018-06-04 NOTE — PLAN OF CARE
I spoke with patient's son and daughter today. They are agreeable with palliative care following at rehab. They agree that if patient fails to improve at rehab then would benefit from hospice.   They are aware of aspiration risk, recurrent pneumonia, wors

## 2018-06-04 NOTE — SLP NOTE
SPEECH DAILY NOTE - INPATIENT    ASSESSMENT & PLAN   ASSESSMENT  Pt seen for dysphagia tx to assess for diet tolerance, r/o aspiration and ensure that pt and staff are f/u with aspiration precautions.   Pt was recommended for a puree diet with nectar thick

## 2018-06-04 NOTE — PLAN OF CARE
DNR!    Aspiration precautions maintained  High risk for skin breakdown precautions maintained  High fall risk precautions maintained  High risk for bleeding precautions maintained    Comments: Pt is A&Ox3-4 (forgetful re: situation at times and voice is h Cup spoonfuls, and taking pt on/off AVAPs and onto NC numerous times.         Problem: CARDIOVASCULAR - ADULT  Goal: Maintains optimal cardiac output and hemodynamic stability  INTERVENTIONS:  - Monitor vital signs, rhythm, and trends  - Monitor for bleedin functional ability and stability  - Promote increasing activity/tolerance for mobility and gait  - Educate and engage patient/family in tolerated activity level and precautions  - Recommend use of sit-stand lift for transfers   Outcome: Progressing    Prob algorithm/standards of care as needed   Outcome: Progressing    Problem: NEUROLOGICAL - ADULT  Goal: Achieves stable or improved neurological status  INTERVENTIONS  - Assess for and report changes in neurological status  - Initiate measures to prevent incr from fall injury  INTERVENTIONS:  - Assess pt frequently for physical needs  - Identify cognitive and physical deficits and behaviors that affect risk of falls.   - [place] fall precautions as indicated by assessment.  - Educate pt/family on patient safet psychosocial needs and initiate Spiritual Care or Behavioral Health consult as needed   Outcome: Progressing

## 2018-06-04 NOTE — PLAN OF CARE
A&ox3, forgetful at times but able to hold a full conversation with accurate detail recall, delayed responses and hoarse, dyspnea with long conversations, dentures soaking in bathroom  Weaned to 4L regular nasal cannula, sats 98%, AVAPS at noc, , (per p Progressing        Impaired Swallowing    • Minimize aspiration risk Progressing        METABOLIC/FLUID AND ELECTROLYTES - ADULT    • Glucose maintained within prescribed range Progressing        RESPIRATORY - ADULT    • Achieves optimal ventilation and ox

## 2018-06-05 NOTE — PHYSICAL THERAPY NOTE
Attempted to see patient for PT treatment. Per RN, pt is not appropriate for PT today. Will follow-up as appropriate.

## 2018-06-05 NOTE — PROGRESS NOTES
Pulmonary Progress Note        NAME: Ricardo Singh - ROOM: 6184/6265-Y - MRN: [id number] - Age: 80year old - : 10/6/1925        SUBJECTIVE: Very restless yesterday and overnight, more somnolent this AM, shakes head yes no but unable to do much else    O WBC 22.9*   HGB 12.7*   .2*   .0         Recent Labs   06/03/18  0819 06/04/18  0443   *  --    K 4.1  --    CL 98*  --    CO2 44.0*  --    BUN 47*  --    CA 9.2  --    MG 1.7* 2.5       No results for input(s): ALT, AST, ALB, AMYLASE

## 2018-06-05 NOTE — CM/SW NOTE
Updated angel at [facility] rehab in [place] that patient passed away as well as arline for AVAPS as well

## 2018-06-05 NOTE — PROGRESS NOTES
Respiratory distress noted during AM assessment w/oxygen saturations at 78% on 5L/NC; NRB mask applied until RT could round to place pt back on AVAPs mask; somnolent and hard to arouse. Rounded w/Dr. Cortes Rides early; made aware of worsening condition.   Dr. Conway Asa

## 2018-06-05 NOTE — PROGRESS NOTES
KATHERIN Dhillon notified that family is present as bedside;  monitoring discontinued and telemetry alarms adjusted to decrease rate of alarms going off in room for family's emotional support.       ADDENDUM:  All three children at bedside asking for

## 2018-06-05 NOTE — PROGRESS NOTES
Charge RN, nursing supervisor, spiritual care, public safety, medical records, Dr. Robyn Garcia (via ANTHONY Mcdaniels), Dr. Teofilo Gamboa, Dr. Trenton Todd (via [place], [place]), and [facility] notified of death (referral #: [id number]).

## 2018-06-05 NOTE — PROGRESS NOTES
UROLOGY PROGRESS    Overnight events noted. Patient not seen, family at bedside. Dennis seen to be draining clear yellow. We will sign off at this time. Please let us know if we can be of any further assistance.     Eloise Clements PA-C

## 2018-06-05 NOTE — PROGRESS NOTES
[facility]  Progress Note     Katelyn Laura Patient Status:  Inpatient    10/6/1925 MRN [id number]   [facility] 8NE-A Attending Lloyd Juarez MD   Hosp Day # 7 PCP Milton Hughes MD     Chief Complaint: dyspnea    S: Patient fat NPH (Human) (Isophane)  13 Units Subcutaneous Nightly   • Fluticasone Propionate  1 spray Each Nare Daily   • Insulin Aspart Pen  1-68 Units Subcutaneous TID AC and HS   • predniSONE  40 mg Oral Daily with breakfast   • Insulin Aspart Pen  1-68 Units Subcu 1-2 days to JORDAN if respiratory status/renal function improved. Plan of care discussed with patient, RN. Patient more fatigued this am. Patient does poorly when not compliance with mask. Ongoing goals of care discussions with family.   Asked RN to page

## 2018-06-05 NOTE — PROGRESS NOTES
[facility]  Progress Note    Melissa Garcia     Subjective:  No chest pain. He is sob. He has pain in right hand. Seems to have started yesterday. Daughter says \"his fingers are always a bit blue\". He can hear me and can nod yes and now.        Intake <0.046 05/23/2018   TROP <0.046 03/14/2016       Lab Results  Component Value Date   INR 1.04 05/23/2018   INR 1.12 (H) 04/01/2015     .         Medications:    Current Facility-Administered Medications:  Insulin NPH (Human) (Isophane) (HUMULIN N,NOVOLIN N) Q15 Min PRN   Or      Glucose-Vitamin C (DEX-4) 4-0.006 g chewable tab 8 tablet 8 tablet Oral Q15 Min PRN       Assessment and Plan:  Principal Problem:    Atrial fibrillation with rapid ventricular response (HCC)  Active Problems:    Oxygen dependent    B

## 2018-06-05 NOTE — PROGRESS NOTES
18 1437   Clinical Encounter Type   Visited With Family   Continue Visiting No   Crisis Visit Death   Patient Spiritual Encounters   Spiritual Interventions  assisted family with  home/family completed and signed  home inform

## 2018-06-05 NOTE — PLAN OF CARE
Called to room from the hallway by the daughter who recognized me. Upon entering room patient supine in bed, ashen in color. No obvious spontaneous respirations and no audible heart tones auscultated after 2 minutes of listening.   No obvious spontaneous

## 2018-06-05 NOTE — PLAN OF CARE
Spoke with family at bedside. Patient respiratory status worse today. He is appropriate for comfort care/hospice. Some disagreement between dtr and son initially but now they both have decided to transition to comfort care.  Reviewed with pulm and Dr. Mauri Arreguin

## 2018-06-05 NOTE — PAYOR COMM NOTE
--------------  CONTINUED STAY REVIEW    Payor: [employer]le [employer]  Subscriber #:  [address]  Authorization Number: [employer] #[id number]    Admit date: 5/29/18  Admit time: 9979    Admitting Physician: Nataly Archer MD  Attending Physician:  Nataly Archer, lozenge     Date Action Dose Route User    6/4/2018 1842 Given 1 lozenge Oral Lisa Hammond, RN      DilTIAZem HCl (CARDIZEM) tab 60 mg     Date Action Dose Route User    6/5/2018 0437 Given 60 mg Oral Nighat Bourgeois RN    6/4/2018 2251 Given 61 m Dose Route User    6/4/2018 1936 Given 4 mg Intravenous Vidal Manning, KACIE      scopolamine (TRANSDERM-SCOP) 1.5 mg patch     Date Action Dose Route User    6/5/2018 1305 Patch Applied 1 patch Transdermal (Behind Right Ear) Rita Trejo RN

## 2018-06-07 RX ORDER — SIMVASTATIN 20 MG
TABLET ORAL
Qty: 90 TABLET | Refills: 1 | OUTPATIENT
Start: 2018-06-07

## 2018-06-08 NOTE — PAYOR COMM NOTE
--------------  DISCHARGE REVIEW    Payor: Sunil Perry  Subscriber #:  [address]  Authorization Number: [employer] #[id number]    Admit date: 5/29/18  Admit time:  1559  Discharge Date: 6/5/2018  4:35 PM     Admitting Physician: MD Steven Grace

## 2018-07-24 ENCOUNTER — MED REC SCAN ONLY (OUTPATIENT)
Dept: FAMILY MEDICINE CLINIC | Facility: CLINIC | Age: 83
End: 2018-07-24

## 2019-02-28 VITALS
BODY MASS INDEX: 26.18 KG/M2 | WEIGHT: 204 LBS | SYSTOLIC BLOOD PRESSURE: 100 MMHG | DIASTOLIC BLOOD PRESSURE: 54 MMHG | HEART RATE: 84 BPM | HEIGHT: 74 IN

## 2023-01-31 NOTE — HISTORICAL OFFICE NOTE
Lia Stapleton  : 10/06/1925  ACCOUNT:  [id number]  [phone]  PCP: Dr. Roxanne Mariee     TODAY'S DATE: 2018  DICTATED BY:  [Dr. Evelin Mei      CHIEF COMPLAINT: [Followup of Atheroscler Native Cor Art, Followup of Benign Hypertension and Follow daily. ALCOHOL: denies drinking. EXERCISE: walking 50 feet. DIET: diabetic. MARITAL STATUS: . OCCUPATION: retired.      ALLERGIES: Aspirin - Oral, rash to large dosages, Levaquin - Intravenous, neck and facial swelling, Nitrostat - Sublingual, cardia his lipid profile checked.]    ASSESSMENT:  1. Atheroscler Native Cor Art  2. Benign Hypertension  3. History of PCI  4. Hyperlipidemia, mixed  5. Pulmonary HTN, arterial secondary      PLAN:  [1.  Lipid profile now.   2.  Return the office in 6 months] Tests Considered But Not Performed No indication for x-ray patient with outpatient Ortho follow-up had MRI no new trauma.

## (undated) NOTE — IP AVS SNAPSHOT
[facility] [place] [address] [place], [address] ~ [phone]                Discharge Summary   3/28/2017    [facility]           Admission Information        Provider Department    3/28/2017 Yimi Fuentes MD  5nw-A         Rikki Herbert Morning Afternoon Evening As Needed    furosemide 20 MG Tabs   Commonly known as:  LASIX   What changed:  Another medication with the same name was removed. Continue taking this medication, and follow the directions you see here.         Take 10 mg -20 mg At meals: if BG is <100 hold insulin 101-140 inject 8 units 141-180 10u 181-220 12u 221-260 14u 261-300 16u 301-350 18u >350 20u    Argenis Moses                        Insulin Pen Needle 31G X 5 MM Misc   Commonly known as:  BD PEN NEEDLE MINI U/F Patient Instructions          aspiration precautions with upright position, small bites and sips, no stool, slow rate, medications 1 pill at the time and present with thickened fluid  HHPT/OT and Speech and  dysphagia therapy  Diet Recommendations - Solids Recent Hematology Lab Results (cont.)  (Last 3 results in the past 90 days)    Neutrophil % Lymphocyte % Monocyte % Eosinophil % Basophil % Prelim Neut Abs Final Neut Abs Lymphocyte Abso Monocyte Absolu Eosinophil Abso Basophil Absolu    (03/28/17)  67.4 ( [address]. [employer]     Call the [employer]k for assistance with your inactive [employer] account    If you have questions, you can call [phone] to talk to our [facility] Staff. Remember, [employer] is NOT to be used for urgent needs. Most common side effects: Dizziness, loss of potassium (increased urination is expected)   What to report to your healthcare team: Dizziness, decreased urine amount           Cholesterol Lowering Medications     simvastatin 20 MG Oral Tab       Use: Lower Respiratory Medications     Aclidinium Bromide (TUDORZA PRESSAIR) 400 MCG/ACT Inhalation Aerosol Powder, Breath Activated    ipratropium-albuterol (DUONEB) 0.5-2.5 (3) MG/3ML Inhalation Solution    PROAIR  (90 BASE) MCG/ACT Inhalation Aero Soln

## (undated) NOTE — MR AVS SNAPSHOT
[address] [id number] [phone]               Thank you for choosing us for your health care visit with Christopher Gamboa MD.  We are glad to serve you and happy to provide you with this summary of Commonly known as:  TUDORZA PRESSAIR           AmLODIPine Besylate 2.5 MG Tabs   Take 1 tablet (2.5 mg total) by mouth every evening. Commonly known as:  NORVASC           aspirin 81 MG Tabs   Take 81 mg by mouth daily.            carvedilol 3.125 MG Tabs USE 2 INHALATIONS EVERY 4 HOURS AS NEEDED FOR WHEEZING           simvastatin 20 MG Tabs   Take 1 tablet (20 mg total) by mouth nightly. Commonly known as:  ZOCOR           SUPER B COMPLEX OR   Take 1 tablet by mouth daily.                 Where to Get You Eat plenty of protein, keep the fat content low Sugars:  sodas and sports drinks, candies and desserts   Eat plenty of low-fat dairy products High fat meats and dairy   Choose whole grain products Foods high in sodium   Water is best for hydration Fast krish

## (undated) NOTE — Clinical Note
Pt is coming for hospital follow up on 4/10/18. Dtr, Zo Alvarez, stating the pt is doing better since d/c. He uses 3 L of oxygen at rest and up to 6 L with activity. [address] services for RN/PT/OT started as of Saturday.  Pt has HFU with Dr. Pardeep Gilmore

## (undated) NOTE — MR AVS SNAPSHOT
[address] [id number] [phone]               Thank you for choosing us for your health care visit with Antionette Mancini MD.  We are glad to serve you and happy to provide you with this summary of Doxycycline Hyclate 100 MG Tabs   Take 1 tablet (100 mg total) by mouth 2 (two) times daily. Commonly known as:  VIBRAMYCIN           escitalopram 10 MG Tabs   Take 1 tablet (10 mg total) by mouth daily.    Commonly known as:  LEXAPRO           furosemid Phone:  [phone]    - Insulin Lispro Prot & Lispro (50-50) 100 UNIT/ML Supn  - Insulin Pen Needle 31G X 5 MM Misc  - methylPREDNISolone 4 MG Tbpk            MyChart     Call the [employer] for assistance with your inactive [email or web address] account    If you hav

## (undated) NOTE — MR AVS SNAPSHOT
[address] [address]  [phone]               Thank you for choosing us for your health care visit with Adrianna Baker MD.  We are glad to serve you and happy to provide you with this summary of [address]. All rights reserved. This information is not intended as a substitute for professional medical care. Always follow your healthcare professional's instructions.         Exercising with Chronic Lung Disease: Increasing Strength  Building muscle strengt 2. Bend one knee. Keep the other leg straight. 3. Inhale. Then while exhaling, lift your straight leg until your knees are lined up. 4. Inhale while you lower your leg. Repeat as directed. Then switch legs.      Leg raise   Note: If you’re not comfortable feeling. If you’re exercising on your own, call your pulmonary rehab team or your healthcare provider.  Stay alert for these signs:  · Unusual or increasing shortness of breath  · Chest pain or discomfort  · Burning, tightness, heaviness, or pressure in you Aclidinium Bromide 400 MCG/ACT Aepb   Inhale 1 puff into the lungs 2 (two) times daily. Commonly known as:  TUDORZA PRESSAIR           aspirin 81 MG Tabs   Take 81 mg by mouth daily.            carvedilol 3.125 MG Tabs   Take 1 tablet (3.125 mg total) by Umeclidinium Bromide 62.5 MCG/INH Aepb   Inhale 1 puff into the lungs daily.    Commonly known as:  [address]                   Referral Information     Referral Order Referred to Tonya Vazquez Phone Visits Status Diagnosis                 MyChart

## (undated) NOTE — MR AVS SNAPSHOT
[address] [address]  [phone]               Thank you for choosing us for your health care visit with Thais Batres MD.  We are glad to serve you and happy to provide you with this summary of caffeine, juices, tea, or soup. Fluids will help loosen secretions in the lung. This will make it easier for you to cough up the phlegm (sputum). If you also have heart or kidney disease, check with your health care provider before you drink extra fluids. 108/74 mmHg 82 97.5 °F (36.4 °C) (Oral) 203 lb           Current Medications          This list is accurate as of: 4/11/17 12:10 PM.  Always use your most recent med list.                Aclidinium Bromide 400 MCG/ACT Aepb   Inhale 1 puff into the lungs 2 Pantoprazole Sodium 40 MG Tbec   Take 1 tablet (40 mg total) by mouth daily.    Commonly known as:  PROTONIX           PROAIR  (90 Base) MCG/ACT Aers   Generic drug:  Albuterol Sulfate HFA   USE 2 INHALATIONS EVERY 4 HOURS AS NEEDED FOR WHEEZING

## (undated) NOTE — ED AVS SNAPSHOT
THE [facility] Emergency Department in [address] [facility]    Phone:  [phone]    Fax:  [address] [facility],[address]   MRN: [id number]    Department:  THE [facility] Emergency Department in [place]   Date of Visit: IF THERE IS ANY CHANGE OR WORSENING OF YOUR CONDITION, CALL YOUR PRIMARY CARE PHYSICIAN AT ONCE OR RETURN IMMEDIATELY TO THE EMERGENCY DEPARTMENT.     If you have been prescribed any medication(s), please fill your prescription right away and begin taking t

## (undated) NOTE — MR AVS SNAPSHOT
[place] [address] [facility] [address]  [phone]               Thank you for choosing us for your health care visit with Freida Dupree MD.  We are glad to serve you and happy to provide you with this summary of 2. Exhale as you lift your foot to the side. You only need to lift it a few inches. Keep your toes pointing forward. Hold the lift until you’re finished exhaling. 3. Inhale while bringing your leg back to your side. Repeat as directed. Then switch sides. 118/72 mmHg 76 200 lb            Current Medications          This list is accurate as of: 2/2/17 11:34 AM.  Always use your most recent med list.                Aclidinium Bromide 400 MCG/ACT Aepb   Inhale 1 puff into the lungs 2 (two) times daily.    Com simvastatin 20 MG Tabs   Take 1 tablet (20 mg total) by mouth nightly. Commonly known as:  ZOCOR           SUPER B COMPLEX OR   Take 1 tablet by mouth daily. Umeclidinium Bromide 62.5 MCG/INH Aepb   Inhale 1 puff into the lungs daily.    Common

## (undated) NOTE — ED AVS SNAPSHOT
[facility] in [address] [facility]    Phone:  [phone]    Fax:  [address] [place] [address],[address]   MRN: [id number]    Department:  [facility] in [place]   Date of Visit: Click [email or web address]      Or call [phone]    If you have any problems with your follow-up, please call our  at [phone]    Si usted tiene algun problema con roldan sequimiento, por favor llame a nuestro adminstrador de casos al (94 24[facility]Hour Pharmacies        Pharmacy Address Phone Number   [address] ([address]) [facility] [id number]   [facility].  ([address] [facility] [address] Visit [email or web address] to learn more.